# Patient Record
Sex: FEMALE | Race: WHITE | NOT HISPANIC OR LATINO | Employment: FULL TIME | URBAN - METROPOLITAN AREA
[De-identification: names, ages, dates, MRNs, and addresses within clinical notes are randomized per-mention and may not be internally consistent; named-entity substitution may affect disease eponyms.]

---

## 2023-08-04 ENCOUNTER — OFFICE VISIT (OUTPATIENT)
Dept: PRIMARY CARE | Facility: CLINIC | Age: 68
End: 2023-08-04
Payer: MEDICARE

## 2023-08-04 VITALS
BODY MASS INDEX: 39.6 KG/M2 | OXYGEN SATURATION: 95 % | HEART RATE: 69 BPM | SYSTOLIC BLOOD PRESSURE: 120 MMHG | DIASTOLIC BLOOD PRESSURE: 60 MMHG | WEIGHT: 276 LBS

## 2023-08-04 DIAGNOSIS — Z79.4 TYPE 2 DIABETES MELLITUS WITHOUT COMPLICATION, WITH LONG-TERM CURRENT USE OF INSULIN (MULTI): ICD-10-CM

## 2023-08-04 DIAGNOSIS — E78.01 FAMILIAL HYPERCHOLESTEREMIA: ICD-10-CM

## 2023-08-04 DIAGNOSIS — I10 BENIGN ESSENTIAL HYPERTENSION: Primary | ICD-10-CM

## 2023-08-04 DIAGNOSIS — Z13.820 ENCOUNTER FOR SCREENING FOR OSTEOPOROSIS: ICD-10-CM

## 2023-08-04 DIAGNOSIS — Z12.31 ENCOUNTER FOR SCREENING MAMMOGRAM FOR MALIGNANT NEOPLASM OF BREAST: ICD-10-CM

## 2023-08-04 DIAGNOSIS — E11.9 TYPE 2 DIABETES MELLITUS WITHOUT COMPLICATION, WITH LONG-TERM CURRENT USE OF INSULIN (MULTI): ICD-10-CM

## 2023-08-04 DIAGNOSIS — Z12.12 SCREENING FOR COLORECTAL CANCER: ICD-10-CM

## 2023-08-04 DIAGNOSIS — Z12.11 SCREENING FOR COLORECTAL CANCER: ICD-10-CM

## 2023-08-04 DIAGNOSIS — I42.9 CARDIOMYOPATHY, UNSPECIFIED TYPE (MULTI): ICD-10-CM

## 2023-08-04 PROBLEM — I49.3 PVC (PREMATURE VENTRICULAR CONTRACTION): Status: ACTIVE | Noted: 2023-08-04

## 2023-08-04 PROBLEM — Z86.79 H/O: HYPERTENSION: Status: ACTIVE | Noted: 2021-09-03

## 2023-08-04 PROBLEM — M76.31 ILIOTIBIAL BAND SYNDROME OF RIGHT SIDE: Status: ACTIVE | Noted: 2023-08-04

## 2023-08-04 PROBLEM — E66.812 OBESITY, CLASS II, BMI 35-39.9: Status: ACTIVE | Noted: 2021-10-28

## 2023-08-04 PROBLEM — E66.9 OBESITY (BMI 30-39.9): Status: ACTIVE | Noted: 2021-09-03

## 2023-08-04 PROBLEM — R94.31 ABNORMAL EKG: Status: ACTIVE | Noted: 2023-08-04

## 2023-08-04 PROBLEM — Z96.641 HISTORY OF TOTAL RIGHT HIP REPLACEMENT: Status: ACTIVE | Noted: 2022-01-28

## 2023-08-04 PROBLEM — Z86.718 HISTORY OF DEEP VENOUS THROMBOSIS: Status: ACTIVE | Noted: 2021-09-10

## 2023-08-04 PROBLEM — R94.39 ABNORMAL STRESS TEST: Status: ACTIVE | Noted: 2023-08-04

## 2023-08-04 PROBLEM — N39.0 ACUTE UTI: Status: ACTIVE | Noted: 2023-08-04

## 2023-08-04 PROBLEM — L65.9 ALOPECIA: Status: ACTIVE | Noted: 2023-08-04

## 2023-08-04 PROBLEM — E66.9 OBESITY: Status: ACTIVE | Noted: 2021-09-03

## 2023-08-04 PROBLEM — M25.551 HIP PAIN, RIGHT: Status: ACTIVE | Noted: 2023-08-04

## 2023-08-04 PROBLEM — E78.49 OTHER HYPERLIPIDEMIA: Status: ACTIVE | Noted: 2021-10-05

## 2023-08-04 PROBLEM — G47.9 SLEEP DIFFICULTIES: Status: ACTIVE | Noted: 2023-08-04

## 2023-08-04 PROBLEM — E66.9 OBESITY, CLASS II, BMI 35-39.9: Status: ACTIVE | Noted: 2021-10-28

## 2023-08-04 PROBLEM — M25.561 KNEE PAIN, RIGHT: Status: ACTIVE | Noted: 2023-08-04

## 2023-08-04 PROBLEM — R53.83 FATIGUE: Status: ACTIVE | Noted: 2023-08-04

## 2023-08-04 PROBLEM — Z86.39 HISTORY OF DIABETES MELLITUS: Status: ACTIVE | Noted: 2021-09-03

## 2023-08-04 PROBLEM — R00.1 BRADYCARDIA: Status: ACTIVE | Noted: 2023-08-04

## 2023-08-04 PROBLEM — M99.05 SEGMENTAL AND SOMATIC DYSFUNCTION OF PELVIC REGION: Status: ACTIVE | Noted: 2023-08-04

## 2023-08-04 PROBLEM — I49.9 IRREGULAR HEARTBEAT: Status: ACTIVE | Noted: 2023-08-04

## 2023-08-04 PROBLEM — R06.02 EXERTIONAL SHORTNESS OF BREATH: Status: ACTIVE | Noted: 2023-08-04

## 2023-08-04 PROCEDURE — 3074F SYST BP LT 130 MM HG: CPT | Performed by: INTERNAL MEDICINE

## 2023-08-04 PROCEDURE — 1159F MED LIST DOCD IN RCRD: CPT | Performed by: INTERNAL MEDICINE

## 2023-08-04 PROCEDURE — 4010F ACE/ARB THERAPY RXD/TAKEN: CPT | Performed by: INTERNAL MEDICINE

## 2023-08-04 PROCEDURE — 3078F DIAST BP <80 MM HG: CPT | Performed by: INTERNAL MEDICINE

## 2023-08-04 PROCEDURE — 1036F TOBACCO NON-USER: CPT | Performed by: INTERNAL MEDICINE

## 2023-08-04 PROCEDURE — 1160F RVW MEDS BY RX/DR IN RCRD: CPT | Performed by: INTERNAL MEDICINE

## 2023-08-04 PROCEDURE — 99214 OFFICE O/P EST MOD 30 MIN: CPT | Performed by: INTERNAL MEDICINE

## 2023-08-04 RX ORDER — CALCIUM CITRATE/VITAMIN D3 200MG-6.25
TABLET ORAL
COMMUNITY

## 2023-08-04 RX ORDER — MUPIROCIN CALCIUM 20 MG/G
CREAM TOPICAL 3 TIMES DAILY
Qty: 15 G | Refills: 1 | Status: SHIPPED | OUTPATIENT
Start: 2023-08-04 | End: 2023-08-04 | Stop reason: ALTCHOICE

## 2023-08-04 RX ORDER — LANCETS 30 GAUGE
EACH MISCELLANEOUS
COMMUNITY
Start: 2023-05-15 | End: 2023-10-11 | Stop reason: SDUPTHER

## 2023-08-04 RX ORDER — ORAL SEMAGLUTIDE 7 MG/1
TABLET ORAL
COMMUNITY
Start: 2021-07-07 | End: 2024-04-09 | Stop reason: SDUPTHER

## 2023-08-04 RX ORDER — ACETAMINOPHEN AND DIPHENHYDRAMINE HYDROCHLORIDE 500; 25 MG/1; MG/1
1 TABLET, FILM COATED ORAL
COMMUNITY
Start: 2021-12-01

## 2023-08-04 RX ORDER — CHOLECALCIFEROL (VITAMIN D3) 125 MCG
CAPSULE ORAL
COMMUNITY

## 2023-08-04 RX ORDER — METFORMIN HYDROCHLORIDE 1000 MG/1
TABLET ORAL
COMMUNITY
End: 2024-05-06

## 2023-08-04 RX ORDER — GLIPIZIDE 10 MG/1
10 TABLET ORAL 2 TIMES DAILY
COMMUNITY
End: 2024-06-09

## 2023-08-04 RX ORDER — CALCIUM CITRATE/VITAMIN D3 200MG-6.25
TABLET ORAL
COMMUNITY
Start: 2015-06-12

## 2023-08-04 RX ORDER — INSULIN DEGLUDEC 200 U/ML
INJECTION, SOLUTION SUBCUTANEOUS
COMMUNITY
End: 2024-04-09 | Stop reason: SDUPTHER

## 2023-08-04 NOTE — PATIENT INSTRUCTIONS
-130  DBP 60-80    Please complete fasting blood work. Fast for 10 hours, black coffee and water the morning of labs are OK.     Mamm/dexa [phone]     [facility]: [phone]    Come back in 6 months

## 2023-08-04 NOTE — PROGRESS NOTES
Subjective   Patient ID: Zonia Schaffer is a 67 y.o. female who presents for Follow-up.    HPI     Overall doing well  BP has been great at home   No CP, SOB, JOHNSON or LE edema     Review of Systems   All other systems reviewed and are negative.      Objective   /81   Pulse 69   Wt 125 kg (276 lb)   SpO2 95%   BMI 39.60 kg/m²     Physical Exam  Constitutional:       Appearance: Normal appearance.   HENT:      Head: Normocephalic and atraumatic.   Cardiovascular:      Rate and Rhythm: Normal rate and regular rhythm.      Heart sounds: Normal heart sounds. No murmur heard.     No gallop.   Pulmonary:      Effort: Pulmonary effort is normal. No respiratory distress.      Breath sounds: No wheezing or rales.   Skin:     General: Skin is warm and dry.      Findings: No rash.   Neurological:      Mental Status: She is alert and oriented to person, place, and time. Mental status is at baseline.   Psychiatric:         Mood and Affect: Mood normal.         Behavior: Behavior normal.         Assessment/Plan       #HTN  -Good at home. Cont carvedilol to 12.5mg BID, lisinopril 40mg daily, HCTZ 25mg daily.      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 Following with cardiology.  Cont carvedilol      #HLD  -Cont atorva 40mg daily , C 10/21 with trivial nonob CAD      #DM2  -Follows with Dr. Juan. Cont Rybelsus, , Tresiba 94 U daily, metformin 1000mg bid and glipizide 10mg bid         Mamm, DEXA  and [place] ordered     Will get Shingrix and P20 at pharmacy      RTC 6 mo

## 2023-09-05 RX ORDER — IBUPROFEN 200 MG
1 TABLET ORAL EVERY 8 HOURS PRN
COMMUNITY
Start: 2021-10-29 | End: 2024-02-09 | Stop reason: ALTCHOICE

## 2023-09-05 RX ORDER — QUINAPRIL 40 MG/1
1 TABLET ORAL EVERY 12 HOURS
COMMUNITY
Start: 2022-12-22 | End: 2024-02-09 | Stop reason: ALTCHOICE

## 2023-09-05 RX ORDER — LANCETS
EACH MISCELLANEOUS
COMMUNITY

## 2023-09-06 PROBLEM — Z79.4 LONG TERM (CURRENT) USE OF INSULIN (MULTI): Status: ACTIVE | Noted: 2023-09-06

## 2023-09-19 DIAGNOSIS — I10 ESSENTIAL (PRIMARY) HYPERTENSION: ICD-10-CM

## 2023-09-20 RX ORDER — LISINOPRIL 40 MG/1
TABLET ORAL
Qty: 90 TABLET | Refills: 1 | Status: SHIPPED | OUTPATIENT
Start: 2023-09-20 | End: 2024-03-18

## 2023-10-09 ENCOUNTER — LAB (OUTPATIENT)
Dept: LAB | Facility: LAB | Age: 68
End: 2023-10-09
Payer: MEDICARE

## 2023-10-09 DIAGNOSIS — Z79.4 TYPE 2 DIABETES MELLITUS WITHOUT COMPLICATION, WITH LONG-TERM CURRENT USE OF INSULIN (MULTI): ICD-10-CM

## 2023-10-09 DIAGNOSIS — E78.01 FAMILIAL HYPERCHOLESTEREMIA: ICD-10-CM

## 2023-10-09 DIAGNOSIS — E11.9 TYPE 2 DIABETES MELLITUS WITHOUT COMPLICATION, WITH LONG-TERM CURRENT USE OF INSULIN (MULTI): ICD-10-CM

## 2023-10-09 LAB
ALBUMIN SERPL BCP-MCNC: 4 G/DL (ref 3.4–5)
ALP SERPL-CCNC: 58 U/L (ref 33–136)
ALT SERPL W P-5'-P-CCNC: 13 U/L (ref 7–45)
ANION GAP SERPL CALC-SCNC: 11 MMOL/L (ref 10–20)
AST SERPL W P-5'-P-CCNC: 13 U/L (ref 9–39)
BILIRUB SERPL-MCNC: 0.5 MG/DL (ref 0–1.2)
BUN SERPL-MCNC: 19 MG/DL (ref 6–23)
CALCIUM SERPL-MCNC: 9 MG/DL (ref 8.6–10.3)
CHLORIDE SERPL-SCNC: 104 MMOL/L (ref 98–107)
CHOLEST SERPL-MCNC: 154 MG/DL (ref 0–199)
CHOLESTEROL/HDL RATIO: 4.4
CO2 SERPL-SCNC: 31 MMOL/L (ref 21–32)
CREAT SERPL-MCNC: 0.67 MG/DL (ref 0.5–1.05)
ERYTHROCYTE [DISTWIDTH] IN BLOOD BY AUTOMATED COUNT: 13.8 % (ref 11.5–14.5)
EST. AVERAGE GLUCOSE BLD GHB EST-MCNC: 137 MG/DL
GFR SERPL CREATININE-BSD FRML MDRD: >90 ML/MIN/1.73M*2
GLUCOSE SERPL-MCNC: 135 MG/DL (ref 74–99)
HBA1C MFR BLD: 6.4 %
HCT VFR BLD AUTO: 43.5 % (ref 36–46)
HDLC SERPL-MCNC: 35.3 MG/DL
HGB BLD-MCNC: 13.6 G/DL (ref 12–16)
LDLC SERPL CALC-MCNC: 75 MG/DL (ref 140–190)
MCH RBC QN AUTO: 28.3 PG (ref 26–34)
MCHC RBC AUTO-ENTMCNC: 31.3 G/DL (ref 32–36)
MCV RBC AUTO: 90 FL (ref 80–100)
NON HDL CHOLESTEROL: 119 MG/DL (ref 0–149)
NRBC BLD-RTO: 0 /100 WBCS (ref 0–0)
PLATELET # BLD AUTO: 213 X10*3/UL (ref 150–450)
PMV BLD AUTO: 11.9 FL (ref 7.5–11.5)
POTASSIUM SERPL-SCNC: 4.4 MMOL/L (ref 3.5–5.3)
PROT SERPL-MCNC: 6.3 G/DL (ref 6.4–8.2)
RBC # BLD AUTO: 4.81 X10*6/UL (ref 4–5.2)
SODIUM SERPL-SCNC: 142 MMOL/L (ref 136–145)
TRIGL SERPL-MCNC: 217 MG/DL (ref 0–149)
VLDL: 43 MG/DL (ref 0–40)
WBC # BLD AUTO: 6.2 X10*3/UL (ref 4.4–11.3)

## 2023-10-09 PROCEDURE — 36415 COLL VENOUS BLD VENIPUNCTURE: CPT

## 2023-10-09 PROCEDURE — 85027 COMPLETE CBC AUTOMATED: CPT

## 2023-10-09 PROCEDURE — 83036 HEMOGLOBIN GLYCOSYLATED A1C: CPT

## 2023-10-09 PROCEDURE — 80061 LIPID PANEL: CPT

## 2023-10-09 PROCEDURE — 80053 COMPREHEN METABOLIC PANEL: CPT

## 2023-10-11 ENCOUNTER — OFFICE VISIT (OUTPATIENT)
Dept: ENDOCRINOLOGY | Facility: CLINIC | Age: 68
End: 2023-10-11
Payer: MEDICARE

## 2023-10-11 VITALS
WEIGHT: 273 LBS | BODY MASS INDEX: 39.17 KG/M2 | DIASTOLIC BLOOD PRESSURE: 81 MMHG | HEART RATE: 74 BPM | SYSTOLIC BLOOD PRESSURE: 179 MMHG

## 2023-10-11 DIAGNOSIS — E11.9 TYPE 2 DIABETES MELLITUS WITHOUT COMPLICATION, WITH LONG-TERM CURRENT USE OF INSULIN (MULTI): Primary | ICD-10-CM

## 2023-10-11 DIAGNOSIS — Z79.4 TYPE 2 DIABETES MELLITUS WITHOUT COMPLICATION, WITH LONG-TERM CURRENT USE OF INSULIN (MULTI): Primary | ICD-10-CM

## 2023-10-11 PROCEDURE — 1160F RVW MEDS BY RX/DR IN RCRD: CPT | Performed by: INTERNAL MEDICINE

## 2023-10-11 PROCEDURE — 1159F MED LIST DOCD IN RCRD: CPT | Performed by: INTERNAL MEDICINE

## 2023-10-11 PROCEDURE — 99214 OFFICE O/P EST MOD 30 MIN: CPT | Performed by: INTERNAL MEDICINE

## 2023-10-11 PROCEDURE — 1036F TOBACCO NON-USER: CPT | Performed by: INTERNAL MEDICINE

## 2023-10-11 PROCEDURE — 4010F ACE/ARB THERAPY RXD/TAKEN: CPT | Performed by: INTERNAL MEDICINE

## 2023-10-11 PROCEDURE — 3044F HG A1C LEVEL LT 7.0%: CPT | Performed by: INTERNAL MEDICINE

## 2023-10-11 PROCEDURE — 3079F DIAST BP 80-89 MM HG: CPT | Performed by: INTERNAL MEDICINE

## 2023-10-11 PROCEDURE — 3048F LDL-C <100 MG/DL: CPT | Performed by: INTERNAL MEDICINE

## 2023-10-11 PROCEDURE — 3077F SYST BP >= 140 MM HG: CPT | Performed by: INTERNAL MEDICINE

## 2023-10-11 RX ORDER — LANCETS 30 GAUGE
EACH MISCELLANEOUS
Qty: 100 EACH | Refills: 3 | Status: SHIPPED | OUTPATIENT
Start: 2023-10-11

## 2023-10-11 ASSESSMENT — ENCOUNTER SYMPTOMS
SORE THROAT: 0
ABDOMINAL PAIN: 0
DIFFICULTY URINATING: 0
UNEXPECTED WEIGHT CHANGE: 0
SHORTNESS OF BREATH: 0
CONSTIPATION: 0
HEADACHES: 0
ENDOCRINE COMMENTS: AS ABOVE
FREQUENCY: 0
NAUSEA: 1
DIARRHEA: 0
VOMITING: 0

## 2023-10-11 NOTE — LETTER
October 11, 2023     Bijan Diaz DO  [id number] [facility]  [address]  [facility] [address]    Patient: Zonia Schaffer   YOB: 1955   Date of Visit: 10/11/2023       Dear Dr. Bijan Diaz DO:    Thank you for referring Zonia Schaffer to me for evaluation. Below are my notes for this consultation.  If you have questions, please do not hesitate to call me. I look forward to following your patient along with you.       Sincerely,     John Juan MD      CC: No Recipients  ______________________________________________________________________________________    History Of Present Illness  Zonia Schaffer is a 67 y.o. female     Duration of type 2 diabetes mellitus:  17 years  Complications:  none    Rybelsus 7 mg/day, with nausea, no vomiting  Metformin 1000 mg BID  Glipizide 10 mg BID  Tresiba 94 units at bedtime    Intolerant to Farxiga due to vaginal itching    Patient is testing glucose 1 time daily  Records reviewed, on file    Last eye exam:  November 2022    Past Medical History  She has a past medical history of Personal history of other diseases of the circulatory system and Personal history of other endocrine, nutritional and metabolic disease.    Surgical History  She has a past surgical history that includes Hernia repair (02/18/2014).     Social History  She reports that she has never smoked. She has never used smokeless tobacco. No history on file for alcohol use and drug use.    Family History  Family History   Problem Relation Name Age of Onset   • Hypertension Mother     • Ovarian cancer Mother     • Other (cardiac disorder) Father     • Diabetes Other Grandmother        Allergies  Sulfa (sulfonamide antibiotics) and Cortisone    Review of Systems   Constitutional:  Negative for unexpected weight change.   HENT:  Negative for sore throat.    Eyes:  Negative for visual disturbance.   Respiratory:  Negative for shortness of breath.    Cardiovascular:  Negative for chest pain.   Gastrointestinal:   "Positive for nausea. Negative for abdominal pain, constipation, diarrhea and vomiting.   Endocrine:        As above   Genitourinary:  Negative for difficulty urinating and frequency.   Neurological:  Negative for headaches.         Last Recorded Vitals  Blood pressure 179/81, pulse 74, weight 124 kg (273 lb).    Physical Exam     Relevant Results  Glucose (mg/dL)   Date Value   10/09/2023 135 (H)   02/01/2023 107 (H)   04/29/2022 301 (H)   01/13/2022 185 (H)     Hemoglobin A1C (%)   Date Value   10/09/2023 6.4 (H)   10/06/2021 7.3 (H)     Bicarbonate (mmol/L)   Date Value   10/09/2023 31   02/01/2023 33 (H)   04/29/2022 29   01/13/2022 31     Urea Nitrogen (mg/dL)   Date Value   10/09/2023 19   02/01/2023 22   04/29/2022 25 (H)   01/13/2022 25 (H)     Creatinine (mg/dL)   Date Value   10/09/2023 0.67   02/01/2023 0.72   04/29/2022 0.85   01/13/2022 0.71     No components found for: \"[id number]\"  Lab Results   Component Value Date    CHOL 154 10/09/2023    CHOL 156 02/01/2023    CHOL 132 01/13/2022     Lab Results   Component Value Date    HDL 35.3 10/09/2023    HDL 33.3 (A) 02/01/2023    HDL 34.2 (A) 01/13/2022     Lab Results   Component Value Date    LDLCALC 75 (L) 10/09/2023     Lab Results   Component Value Date    TRIG 217 (H) 10/09/2023    TRIG 216 (H) 02/01/2023    TRIG 179 (H) 01/13/2022     IMPRESSION  TYPE 2 DIABETES MELLITUS  LONG TERM CURRENT INSULIN USE  Excellent glucose control      RECOMMENDATIONS  Continue current program    Follow up 6 months    Labs as ordered by Dr. Diaz.  Please bring glucose records to all appointments.           "

## 2023-10-11 NOTE — PROGRESS NOTES
History Of Present Illness  Zonia Schaffer is a 67 y.o. female     Duration of type 2 diabetes mellitus:  17 years  Complications:  none    Rybelsus 7 mg/day, with nausea, no vomiting  Metformin 1000 mg BID  Glipizide 10 mg BID  Tresiba 94 units at bedtime    Intolerant to Farxiga due to vaginal itching    Patient is testing glucose 1 time daily  Records reviewed, on file    Last eye exam:  November 2022    Past Medical History  She has a past medical history of Personal history of other diseases of the circulatory system and Personal history of other endocrine, nutritional and metabolic disease.    Surgical History  She has a past surgical history that includes Hernia repair (02/18/2014).     Social History  She reports that she has never smoked. She has never used smokeless tobacco. No history on file for alcohol use and drug use.    Family History  Family History   Problem Relation Name Age of Onset    Hypertension Mother      Ovarian cancer Mother      Other (cardiac disorder) Father      Diabetes Other Grandmother        Allergies  Sulfa (sulfonamide antibiotics) and Cortisone    Review of Systems   Constitutional:  Negative for unexpected weight change.   HENT:  Negative for sore throat.    Eyes:  Negative for visual disturbance.   Respiratory:  Negative for shortness of breath.    Cardiovascular:  Negative for chest pain.   Gastrointestinal:  Positive for nausea. Negative for abdominal pain, constipation, diarrhea and vomiting.   Endocrine:        As above   Genitourinary:  Negative for difficulty urinating and frequency.   Neurological:  Negative for headaches.         Last Recorded Vitals  Blood pressure 179/81, pulse 74, weight 124 kg (273 lb).    Physical Exam     Relevant Results  Glucose (mg/dL)   Date Value   10/09/2023 135 (H)   02/01/2023 107 (H)   04/29/2022 301 (H)   01/13/2022 185 (H)     Hemoglobin A1C (%)   Date Value   10/09/2023 6.4 (H)   10/06/2021 7.3 (H)     Bicarbonate (mmol/L)   Date  "Value   10/09/2023 31   02/01/2023 33 (H)   04/29/2022 29   01/13/2022 31     Urea Nitrogen (mg/dL)   Date Value   10/09/2023 19   02/01/2023 22   04/29/2022 25 (H)   01/13/2022 25 (H)     Creatinine (mg/dL)   Date Value   10/09/2023 0.67   02/01/2023 0.72   04/29/2022 0.85   01/13/2022 0.71     No components found for: \"[id number]\"  Lab Results   Component Value Date    CHOL 154 10/09/2023    CHOL 156 02/01/2023    CHOL 132 01/13/2022     Lab Results   Component Value Date    HDL 35.3 10/09/2023    HDL 33.3 (A) 02/01/2023    HDL 34.2 (A) 01/13/2022     Lab Results   Component Value Date    LDLCALC 75 (L) 10/09/2023     Lab Results   Component Value Date    TRIG 217 (H) 10/09/2023    TRIG 216 (H) 02/01/2023    TRIG 179 (H) 01/13/2022     IMPRESSION  TYPE 2 DIABETES MELLITUS  LONG TERM CURRENT INSULIN USE  Excellent glucose control      RECOMMENDATIONS  Continue current program    Follow up 6 months    Labs as ordered by Dr. Diaz.  Please bring glucose records to all appointments.      "

## 2023-11-16 DIAGNOSIS — I10 ESSENTIAL (PRIMARY) HYPERTENSION: ICD-10-CM

## 2023-11-16 DIAGNOSIS — E78.01 FAMILIAL HYPERCHOLESTEROLEMIA: ICD-10-CM

## 2023-11-16 NOTE — TELEPHONE ENCOUNTER
Requested Prescriptions     Pending Prescriptions Disp Refills    carvedilol (Coreg) 12.5 mg tablet [Pharmacy Med Name: carvedilol 12.5 mg tablet] 180 tablet 1     Sig: TAKE 1 TABLET BY MOUTH TWICE DAILY    atorvastatin (Lipitor) 40 mg tablet [Pharmacy Med Name: atorvastatin 40 mg tablet] 90 tablet 1     Sig: TAKE 1 TABLET BY MOUTH EVERY DAY    hydroCHLOROthiazide (HYDRODiuril) 25 mg tablet [Pharmacy Med Name: hydrochlorothiazide 25 mg tablet] 90 tablet 1     Sig: TAKE 1 TABLET BY MOUTH EVERY DAY

## 2023-11-17 RX ORDER — CARVEDILOL 12.5 MG/1
TABLET ORAL
Qty: 180 TABLET | Refills: 1 | Status: SHIPPED | OUTPATIENT
Start: 2023-11-17 | End: 2024-05-06 | Stop reason: SDUPTHER

## 2023-11-17 RX ORDER — ATORVASTATIN CALCIUM 40 MG/1
TABLET, FILM COATED ORAL
Qty: 90 TABLET | Refills: 1 | Status: SHIPPED | OUTPATIENT
Start: 2023-11-17 | End: 2024-05-13 | Stop reason: SDUPTHER

## 2023-11-17 RX ORDER — HYDROCHLOROTHIAZIDE 25 MG/1
TABLET ORAL
Qty: 90 TABLET | Refills: 1 | Status: SHIPPED | OUTPATIENT
Start: 2023-11-17 | End: 2024-05-13 | Stop reason: SDUPTHER

## 2024-02-07 NOTE — PROGRESS NOTES
Subjective   Patient ID: Zonia Schaffer is a 68 y.o. female who presents for Medicare Annual Wellness Visit Subsequent.    HPI     Overall doing well  No specific concerns   No CP, SOB, JOHNSON or LE edema       Review of Systems   All other systems reviewed and are negative.      Objective   There were no vitals taken for this visit.    Physical Exam  Constitutional:       Appearance: Normal appearance.   HENT:      Head: Normocephalic and atraumatic.   Cardiovascular:      Rate and Rhythm: Normal rate and regular rhythm.      Heart sounds: Normal heart sounds. No murmur heard.     No gallop.   Pulmonary:      Effort: Pulmonary effort is normal. No respiratory distress.      Breath sounds: No wheezing or rales.   Musculoskeletal:      Comments: Trace b/l LE edma, purplish discoloration of b/l LE    Skin:     General: Skin is warm and dry.      Findings: No rash.   Neurological:      Mental Status: She is alert and oriented to person, place, and time. Mental status is at baseline.   Psychiatric:         Mood and Affect: Mood normal.         Behavior: Behavior normal.         Assessment/Plan       #HTN  -Good at home. Cont carvedilol to 12.5mg BID, lisinopril 40mg daily, HCTZ 25mg daily.      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 Following with cardiology.  Cont carvedilol      #HLD  -Cont atorva 40mg daily , [facility] 10/21 with trivial nonob CAD      #DM2  -Follows with Dr. Juan. Cont Rybelsus, , Tresiba 94 U daily, metformin 1000mg bid and glipizide 10mg bid     #b/l LE venous insufficiency   -Encouraged compression stockings and leg elevation         Mamm, DEXA  and [place] ordered--reminded to complete      Will get Shingrix at pharmacy       RTC 6 mo

## 2024-02-09 ENCOUNTER — OFFICE VISIT (OUTPATIENT)
Dept: PRIMARY CARE | Facility: CLINIC | Age: 69
End: 2024-02-09
Payer: MEDICARE

## 2024-02-09 VITALS
SYSTOLIC BLOOD PRESSURE: 130 MMHG | OXYGEN SATURATION: 96 % | WEIGHT: 270 LBS | HEART RATE: 61 BPM | BODY MASS INDEX: 38.65 KG/M2 | DIASTOLIC BLOOD PRESSURE: 70 MMHG | HEIGHT: 70 IN

## 2024-02-09 DIAGNOSIS — I10 BENIGN ESSENTIAL HYPERTENSION: ICD-10-CM

## 2024-02-09 DIAGNOSIS — I42.9 CARDIOMYOPATHY, UNSPECIFIED TYPE (MULTI): ICD-10-CM

## 2024-02-09 DIAGNOSIS — Z79.4 TYPE 2 DIABETES MELLITUS WITH HYPERGLYCEMIA, WITH LONG-TERM CURRENT USE OF INSULIN (MULTI): ICD-10-CM

## 2024-02-09 DIAGNOSIS — E11.65 TYPE 2 DIABETES MELLITUS WITH HYPERGLYCEMIA, WITH LONG-TERM CURRENT USE OF INSULIN (MULTI): ICD-10-CM

## 2024-02-09 DIAGNOSIS — E11.9 TYPE 2 DIABETES MELLITUS WITHOUT COMPLICATION, WITH LONG-TERM CURRENT USE OF INSULIN (MULTI): ICD-10-CM

## 2024-02-09 DIAGNOSIS — E78.01 FAMILIAL HYPERCHOLESTEREMIA: ICD-10-CM

## 2024-02-09 DIAGNOSIS — Z00.00 ROUTINE GENERAL MEDICAL EXAMINATION AT HEALTH CARE FACILITY: Primary | ICD-10-CM

## 2024-02-09 DIAGNOSIS — Z79.4 TYPE 2 DIABETES MELLITUS WITHOUT COMPLICATION, WITH LONG-TERM CURRENT USE OF INSULIN (MULTI): ICD-10-CM

## 2024-02-09 DIAGNOSIS — I87.2 VENOUS INSUFFICIENCY OF BOTH LOWER EXTREMITIES: ICD-10-CM

## 2024-02-09 PROCEDURE — 1159F MED LIST DOCD IN RCRD: CPT | Performed by: INTERNAL MEDICINE

## 2024-02-09 PROCEDURE — 4010F ACE/ARB THERAPY RXD/TAKEN: CPT | Performed by: INTERNAL MEDICINE

## 2024-02-09 PROCEDURE — G0439 PPPS, SUBSEQ VISIT: HCPCS | Performed by: INTERNAL MEDICINE

## 2024-02-09 PROCEDURE — 1170F FXNL STATUS ASSESSED: CPT | Performed by: INTERNAL MEDICINE

## 2024-02-09 PROCEDURE — 3078F DIAST BP <80 MM HG: CPT | Performed by: INTERNAL MEDICINE

## 2024-02-09 PROCEDURE — 1036F TOBACCO NON-USER: CPT | Performed by: INTERNAL MEDICINE

## 2024-02-09 PROCEDURE — 3075F SYST BP GE 130 - 139MM HG: CPT | Performed by: INTERNAL MEDICINE

## 2024-02-09 PROCEDURE — 1160F RVW MEDS BY RX/DR IN RCRD: CPT | Performed by: INTERNAL MEDICINE

## 2024-02-09 PROCEDURE — 99214 OFFICE O/P EST MOD 30 MIN: CPT | Performed by: INTERNAL MEDICINE

## 2024-02-09 ASSESSMENT — ACTIVITIES OF DAILY LIVING (ADL)
DOING_HOUSEWORK: INDEPENDENT
MANAGING_FINANCES: INDEPENDENT
GROCERY_SHOPPING: INDEPENDENT
BATHING: INDEPENDENT
DRESSING: INDEPENDENT
TAKING_MEDICATION: INDEPENDENT

## 2024-02-09 ASSESSMENT — PATIENT HEALTH QUESTIONNAIRE - PHQ9
SUM OF ALL RESPONSES TO PHQ9 QUESTIONS 1 AND 2: 0
2. FEELING DOWN, DEPRESSED OR HOPELESS: NOT AT ALL
1. LITTLE INTEREST OR PLEASURE IN DOING THINGS: NOT AT ALL

## 2024-02-09 NOTE — PATIENT INSTRUCTIONS
Please complete fasting blood work. Fast for 10 hours, black coffee and water the morning of labs are OK.     DEXA/Mamm  [phone]     [facility]: [phone]   [facility]: [phone]     Compressions and leg elevation for venous insuffiency    6 mo , get fast labs in March

## 2024-03-17 DIAGNOSIS — I10 ESSENTIAL (PRIMARY) HYPERTENSION: ICD-10-CM

## 2024-03-18 RX ORDER — LISINOPRIL 40 MG/1
TABLET ORAL
Qty: 90 TABLET | Refills: 2 | Status: SHIPPED | OUTPATIENT
Start: 2024-03-18

## 2024-03-18 NOTE — TELEPHONE ENCOUNTER
Requested Prescriptions     Pending Prescriptions Disp Refills    lisinopril 40 mg tablet 90 tablet 2     Sig: TAKE 1 TABLET BY MOUTH ONCE DAILY

## 2024-04-09 ENCOUNTER — OFFICE VISIT (OUTPATIENT)
Dept: ENDOCRINOLOGY | Facility: CLINIC | Age: 69
End: 2024-04-09
Payer: MEDICARE

## 2024-04-09 VITALS
DIASTOLIC BLOOD PRESSURE: 78 MMHG | WEIGHT: 268.8 LBS | RESPIRATION RATE: 18 BRPM | HEART RATE: 69 BPM | BODY MASS INDEX: 38.57 KG/M2 | SYSTOLIC BLOOD PRESSURE: 136 MMHG

## 2024-04-09 DIAGNOSIS — E11.9 TYPE 2 DIABETES MELLITUS WITHOUT COMPLICATION, WITH LONG-TERM CURRENT USE OF INSULIN (MULTI): Primary | ICD-10-CM

## 2024-04-09 DIAGNOSIS — Z79.4 TYPE 2 DIABETES MELLITUS WITHOUT COMPLICATION, WITH LONG-TERM CURRENT USE OF INSULIN (MULTI): Primary | ICD-10-CM

## 2024-04-09 LAB — POC HEMOGLOBIN A1C: 6.6 % (ref 4.2–6.5)

## 2024-04-09 PROCEDURE — 83036 HEMOGLOBIN GLYCOSYLATED A1C: CPT | Performed by: INTERNAL MEDICINE

## 2024-04-09 PROCEDURE — 3075F SYST BP GE 130 - 139MM HG: CPT | Performed by: INTERNAL MEDICINE

## 2024-04-09 PROCEDURE — 99214 OFFICE O/P EST MOD 30 MIN: CPT | Performed by: INTERNAL MEDICINE

## 2024-04-09 PROCEDURE — 4010F ACE/ARB THERAPY RXD/TAKEN: CPT | Performed by: INTERNAL MEDICINE

## 2024-04-09 PROCEDURE — 3078F DIAST BP <80 MM HG: CPT | Performed by: INTERNAL MEDICINE

## 2024-04-09 PROCEDURE — 3048F LDL-C <100 MG/DL: CPT | Performed by: INTERNAL MEDICINE

## 2024-04-09 PROCEDURE — 1159F MED LIST DOCD IN RCRD: CPT | Performed by: INTERNAL MEDICINE

## 2024-04-09 PROCEDURE — 1160F RVW MEDS BY RX/DR IN RCRD: CPT | Performed by: INTERNAL MEDICINE

## 2024-04-09 PROCEDURE — 1126F AMNT PAIN NOTED NONE PRSNT: CPT | Performed by: INTERNAL MEDICINE

## 2024-04-09 RX ORDER — INSULIN DEGLUDEC 200 U/ML
INJECTION, SOLUTION SUBCUTANEOUS
Qty: 18 ML | Refills: 11 | Status: SHIPPED | OUTPATIENT
Start: 2024-04-09

## 2024-04-09 RX ORDER — ORAL SEMAGLUTIDE 7 MG/1
7 TABLET ORAL DAILY
Qty: 30 TABLET | Refills: 11 | Status: SHIPPED | OUTPATIENT
Start: 2024-04-09 | End: 2025-04-09

## 2024-04-09 ASSESSMENT — PAIN SCALES - GENERAL: PAINLEVEL: 0-NO PAIN

## 2024-04-09 NOTE — PATIENT INSTRUCTIONS
A1c 6.6%    RECOMMENDATIONS  Continue current program    Follow up 6 months  Labs as ordered by Dr. Diaz.

## 2024-04-09 NOTE — PROGRESS NOTES
"History Of Present Illness  Zonia Schaffer is a 68 y.o. female     Duration of type 2 diabetes mellitus:  17 years  Complications:  none       Rybelsus 7 mg/day, with nausea, no vomiting  Metformin 1000 mg BID  Glipizide 10 mg BID  Tresiba 94 units at bedtime    Intolerant to Farxiga due to vaginal itching     Past Medical History  She has a past medical history of Personal history of other diseases of the circulatory system and Personal history of other endocrine, nutritional and metabolic disease.    Surgical History  She has a past surgical history that includes Hernia repair (02/18/2014).     Social History  She reports that she has never smoked. She has never used smokeless tobacco. No history on file for alcohol use and drug use.    Family History  Family History   Problem Relation Name Age of Onset    Hypertension Mother      Ovarian cancer Mother      Other (cardiac disorder) Father      Diabetes Other Grandmother        Medications  Current Outpatient Medications   Medication Instructions    atorvastatin (Lipitor) 40 mg tablet TAKE 1 TABLET BY MOUTH EVERY DAY    blood sugar diagnostic (True Metrix Glucose Test Strip) strip     carvedilol (Coreg) 12.5 mg tablet TAKE 1 TABLET BY MOUTH TWICE DAILY    cholecalciferol (Vitamin D-3) 125 MCG (5000 UT) capsule oral    diphenhydrAMINE-acetaminophen (Tylenol PM Extra Strength)  mg per tablet 1 tablet, oral    glipiZIDE (GLUCOTROL) 10 mg, oral, 2 times daily    hydroCHLOROthiazide (HYDRODiuril) 25 mg tablet TAKE 1 TABLET BY MOUTH EVERY DAY    lancets misc For glucose testing once daily    lisinopril 40 mg tablet TAKE 1 TABLET BY MOUTH ONCE DAILY    metFORMIN (Glucophage) 1,000 mg tablet     Rybelsus 7 mg tablet     Tresiba FlexTouch U-200 200 unit/mL (3 mL) injection INJECT 94 UNITS SUBCUTANEOUSLY AT BEDTIME AS DIRECTED    True Metrix Glucose Test Strip strip Daily RT    Unifine Pentips 31 gauge x 1/4\" needle Once daily       Allergies  Dapagliflozin, Sulfa " (sulfonamide antibiotics), and Cortisone    Review of Systems   Constitutional:  Negative for appetite change and fever.   HENT:  Negative for sore throat.         Denies dry mouth   Eyes:  Negative for visual disturbance.   Respiratory:  Negative for cough and shortness of breath.    Cardiovascular:  Positive for palpitations. Negative for chest pain.   Gastrointestinal:  Positive for nausea. Negative for abdominal pain, constipation, diarrhea and vomiting.   Endocrine: Negative for polydipsia and polyuria.   Genitourinary:  Negative for frequency.   Musculoskeletal:  Negative for arthralgias.   Skin:  Negative for rash.   Neurological:  Negative for headaches.   Psychiatric/Behavioral:  The patient is not nervous/anxious.          Last Recorded Vitals  Blood pressure 136/78, pulse 69, resp. rate 18, weight 122 kg (268 lb 12.8 oz).    Physical Exam  Constitutional:       General: She is not in acute distress.  HENT:      Head: Normocephalic.      Mouth/Throat:      Mouth: Mucous membranes are moist.   Eyes:      Extraocular Movements: Extraocular movements intact.   Neck:      Thyroid: No thyroid mass or thyromegaly.   Cardiovascular:      Pulses:           Radial pulses are 2+ on the right side and 2+ on the left side.   Musculoskeletal:      Right lower leg: No edema.      Left lower leg: No edema.      Right foot: No deformity.      Left foot: No deformity.   Feet:      Comments: Callus formation at heels  Lymphadenopathy:      Cervical: No cervical adenopathy.   Neurological:      Mental Status: She is alert.      Motor: No tremor.   Psychiatric:         Mood and Affect: Affect normal.          Relevant Results  Glucose (mg/dL)   Date Value   10/09/2023 135 (H)   02/01/2023 107 (H)   04/29/2022 301 (H)   01/13/2022 185 (H)     POC HEMOGLOBIN A1c (%)   Date Value   04/09/2024 6.6 (A)     Hemoglobin A1C (%)   Date Value   10/09/2023 6.4 (H)   10/06/2021 7.3 (H)     Bicarbonate (mmol/L)   Date Value   10/09/2023  31   02/01/2023 33 (H)   04/29/2022 29   01/13/2022 31     Urea Nitrogen (mg/dL)   Date Value   10/09/2023 19   02/01/2023 22   04/29/2022 25 (H)   01/13/2022 25 (H)     Creatinine (mg/dL)   Date Value   10/09/2023 0.67   02/01/2023 0.72   04/29/2022 0.85   01/13/2022 0.71     Lab Results   Component Value Date    CHOL 154 10/09/2023    CHOL 156 02/01/2023    CHOL 132 01/13/2022     Lab Results   Component Value Date    HDL 35.3 10/09/2023    HDL 33.3 (A) 02/01/2023    HDL 34.2 (A) 01/13/2022     Lab Results   Component Value Date    LDLCALC 75 (L) 10/09/2023     Lab Results   Component Value Date    TRIG 217 (H) 10/09/2023    TRIG 216 (H) 02/01/2023    TRIG 179 (H) 01/13/2022       IMPRESSION  TYPE 2 DIABETES MELLITUS   LONG TERM CURRENT INSULIN USE  Rapid A1c 6.6%      RECOMMENDATIONS  Continue current program    Follow up 6 months  Labs as ordered by Dr. Diaz.

## 2024-04-10 ENCOUNTER — LAB (OUTPATIENT)
Dept: LAB | Facility: LAB | Age: 69
End: 2024-04-10
Payer: MEDICARE

## 2024-04-10 DIAGNOSIS — E78.01 FAMILIAL HYPERCHOLESTEREMIA: ICD-10-CM

## 2024-04-10 LAB
ALBUMIN SERPL BCP-MCNC: 4 G/DL (ref 3.4–5)
ALP SERPL-CCNC: 57 U/L (ref 33–136)
ALT SERPL W P-5'-P-CCNC: 12 U/L (ref 7–45)
ANION GAP SERPL CALC-SCNC: 13 MMOL/L (ref 10–20)
AST SERPL W P-5'-P-CCNC: 14 U/L (ref 9–39)
BILIRUB SERPL-MCNC: 0.5 MG/DL (ref 0–1.2)
BUN SERPL-MCNC: 25 MG/DL (ref 6–23)
CALCIUM SERPL-MCNC: 9.1 MG/DL (ref 8.6–10.3)
CHLORIDE SERPL-SCNC: 103 MMOL/L (ref 98–107)
CHOLEST SERPL-MCNC: 160 MG/DL (ref 0–199)
CHOLESTEROL/HDL RATIO: 5
CO2 SERPL-SCNC: 29 MMOL/L (ref 21–32)
CREAT SERPL-MCNC: 0.76 MG/DL (ref 0.5–1.05)
EGFRCR SERPLBLD CKD-EPI 2021: 87 ML/MIN/1.73M*2
ERYTHROCYTE [DISTWIDTH] IN BLOOD BY AUTOMATED COUNT: 13.6 % (ref 11.5–14.5)
GLUCOSE SERPL-MCNC: 98 MG/DL (ref 74–99)
HCT VFR BLD AUTO: 41.9 % (ref 36–46)
HDLC SERPL-MCNC: 32.2 MG/DL
HGB BLD-MCNC: 13.4 G/DL (ref 12–16)
LDLC SERPL CALC-MCNC: 85 MG/DL
MCH RBC QN AUTO: 28.5 PG (ref 26–34)
MCHC RBC AUTO-ENTMCNC: 32 G/DL (ref 32–36)
MCV RBC AUTO: 89 FL (ref 80–100)
NON HDL CHOLESTEROL: 128 MG/DL (ref 0–149)
NRBC BLD-RTO: 0 /100 WBCS (ref 0–0)
PLATELET # BLD AUTO: 208 X10*3/UL (ref 150–450)
POTASSIUM SERPL-SCNC: 4.2 MMOL/L (ref 3.5–5.3)
PROT SERPL-MCNC: 6.4 G/DL (ref 6.4–8.2)
RBC # BLD AUTO: 4.71 X10*6/UL (ref 4–5.2)
SODIUM SERPL-SCNC: 141 MMOL/L (ref 136–145)
TRIGL SERPL-MCNC: 213 MG/DL (ref 0–149)
VLDL: 43 MG/DL (ref 0–40)
WBC # BLD AUTO: 6.4 X10*3/UL (ref 4.4–11.3)

## 2024-04-10 PROCEDURE — 85027 COMPLETE CBC AUTOMATED: CPT

## 2024-04-10 PROCEDURE — 80053 COMPREHEN METABOLIC PANEL: CPT

## 2024-04-10 PROCEDURE — 80061 LIPID PANEL: CPT

## 2024-04-10 PROCEDURE — 36415 COLL VENOUS BLD VENIPUNCTURE: CPT

## 2024-05-06 DIAGNOSIS — I10 ESSENTIAL (PRIMARY) HYPERTENSION: ICD-10-CM

## 2024-05-06 DIAGNOSIS — E11.9 TYPE 2 DIABETES MELLITUS WITHOUT COMPLICATIONS (MULTI): ICD-10-CM

## 2024-05-06 RX ORDER — METFORMIN HYDROCHLORIDE 1000 MG/1
1000 TABLET ORAL
Qty: 180 TABLET | Refills: 3 | Status: SHIPPED | OUTPATIENT
Start: 2024-05-06

## 2024-05-06 RX ORDER — CARVEDILOL 12.5 MG/1
12.5 TABLET ORAL 2 TIMES DAILY
Qty: 180 TABLET | Refills: 1 | Status: SHIPPED | OUTPATIENT
Start: 2024-05-06

## 2024-05-13 DIAGNOSIS — I10 ESSENTIAL (PRIMARY) HYPERTENSION: ICD-10-CM

## 2024-05-13 DIAGNOSIS — E78.01 FAMILIAL HYPERCHOLESTEROLEMIA: ICD-10-CM

## 2024-05-13 RX ORDER — ATORVASTATIN CALCIUM 40 MG/1
40 TABLET, FILM COATED ORAL DAILY
Qty: 90 TABLET | Refills: 1 | Status: SHIPPED | OUTPATIENT
Start: 2024-05-13

## 2024-05-13 RX ORDER — HYDROCHLOROTHIAZIDE 25 MG/1
25 TABLET ORAL DAILY
Qty: 90 TABLET | Refills: 1 | Status: SHIPPED | OUTPATIENT
Start: 2024-05-13

## 2024-06-04 DIAGNOSIS — E11.9 TYPE 2 DIABETES MELLITUS WITHOUT COMPLICATIONS (MULTI): ICD-10-CM

## 2024-06-09 RX ORDER — GLIPIZIDE 10 MG/1
10 TABLET ORAL 2 TIMES DAILY
Qty: 180 TABLET | Refills: 3 | Status: SHIPPED | OUTPATIENT
Start: 2024-06-09

## 2024-07-31 ASSESSMENT — ENCOUNTER SYMPTOMS
APPETITE CHANGE: 0
POLYDIPSIA: 0
VOMITING: 0
DIARRHEA: 0
FEVER: 0
ABDOMINAL PAIN: 0
SHORTNESS OF BREATH: 0
PALPITATIONS: 1
NERVOUS/ANXIOUS: 0
ARTHRALGIAS: 0
HEADACHES: 0
CONSTIPATION: 0
COUGH: 0
NAUSEA: 1
FREQUENCY: 0
SORE THROAT: 0

## 2024-08-01 DIAGNOSIS — Z12.12 SCREENING FOR COLORECTAL CANCER: ICD-10-CM

## 2024-08-01 DIAGNOSIS — Z13.820 ENCOUNTER FOR SCREENING FOR OSTEOPOROSIS: Primary | ICD-10-CM

## 2024-08-01 DIAGNOSIS — Z12.11 SCREENING FOR COLORECTAL CANCER: ICD-10-CM

## 2024-08-01 DIAGNOSIS — Z12.31 ENCOUNTER FOR SCREENING MAMMOGRAM FOR MALIGNANT NEOPLASM OF BREAST: ICD-10-CM

## 2024-08-05 ASSESSMENT — LIFESTYLE VARIABLES
3_OR_MORE_DRINKS_PER_DAY: N
CURRENT_SMOKER: N

## 2024-08-07 ENCOUNTER — HOSPITAL ENCOUNTER (OUTPATIENT)
Dept: RADIOLOGY | Facility: HOSPITAL | Age: 69
End: 2024-08-07
Payer: MEDICARE

## 2024-08-07 ENCOUNTER — APPOINTMENT (OUTPATIENT)
Dept: RADIOLOGY | Facility: HOSPITAL | Age: 69
End: 2024-08-07
Payer: MEDICARE

## 2024-08-07 ASSESSMENT — LIFESTYLE VARIABLES
CURRENT_SMOKER: N
3_OR_MORE_DRINKS_PER_DAY: N

## 2024-08-09 ENCOUNTER — APPOINTMENT (OUTPATIENT)
Dept: PRIMARY CARE | Facility: CLINIC | Age: 69
End: 2024-08-09
Payer: MEDICARE

## 2024-08-15 ENCOUNTER — ANESTHESIA EVENT (OUTPATIENT)
Dept: OPERATING ROOM | Facility: HOSPITAL | Age: 69
End: 2024-08-15
Payer: MEDICARE

## 2024-08-15 RX ORDER — ACETAMINOPHEN 325 MG/1
650 TABLET ORAL EVERY 4 HOURS PRN
Status: CANCELLED | OUTPATIENT
Start: 2024-08-15

## 2024-08-15 RX ORDER — ALBUTEROL SULFATE 0.83 MG/ML
2.5 SOLUTION RESPIRATORY (INHALATION) ONCE AS NEEDED
Status: CANCELLED | OUTPATIENT
Start: 2024-08-15

## 2024-08-15 RX ORDER — ONDANSETRON HYDROCHLORIDE 2 MG/ML
8 INJECTION, SOLUTION INTRAVENOUS ONCE
Status: CANCELLED | OUTPATIENT
Start: 2024-08-15 | End: 2024-08-15

## 2024-08-16 ENCOUNTER — ANESTHESIA (OUTPATIENT)
Dept: OPERATING ROOM | Facility: HOSPITAL | Age: 69
End: 2024-08-16
Payer: MEDICARE

## 2024-08-16 ENCOUNTER — HOSPITAL ENCOUNTER (OUTPATIENT)
Dept: OPERATING ROOM | Facility: HOSPITAL | Age: 69
Setting detail: OUTPATIENT SURGERY
Discharge: HOME | End: 2024-08-16
Payer: MEDICARE

## 2024-08-16 VITALS
SYSTOLIC BLOOD PRESSURE: 129 MMHG | DIASTOLIC BLOOD PRESSURE: 59 MMHG | WEIGHT: 270.06 LBS | BODY MASS INDEX: 38.66 KG/M2 | OXYGEN SATURATION: 96 % | HEIGHT: 70 IN | TEMPERATURE: 97.7 F | RESPIRATION RATE: 15 BRPM | HEART RATE: 69 BPM

## 2024-08-16 DIAGNOSIS — Z12.11 SCREENING FOR COLORECTAL CANCER: ICD-10-CM

## 2024-08-16 DIAGNOSIS — Z12.12 SCREENING FOR COLORECTAL CANCER: ICD-10-CM

## 2024-08-16 LAB — GLUCOSE BLD MANUAL STRIP-MCNC: 130 MG/DL (ref 74–99)

## 2024-08-16 PROCEDURE — 3600000002 HC OR TIME - INITIAL BASE CHARGE - PROCEDURE LEVEL TWO: Performed by: ANESTHESIOLOGY

## 2024-08-16 PROCEDURE — G0121 COLON CA SCRN NOT HI RSK IND: HCPCS | Performed by: SURGERY

## 2024-08-16 PROCEDURE — 2500000004 HC RX 250 GENERAL PHARMACY W/ HCPCS (ALT 636 FOR OP/ED): Performed by: NURSE ANESTHETIST, CERTIFIED REGISTERED

## 2024-08-16 PROCEDURE — 3600000007 HC OR TIME - EACH INCREMENTAL 1 MINUTE - PROCEDURE LEVEL TWO: Performed by: ANESTHESIOLOGY

## 2024-08-16 PROCEDURE — 3700000001 HC GENERAL ANESTHESIA TIME - INITIAL BASE CHARGE: Performed by: ANESTHESIOLOGY

## 2024-08-16 PROCEDURE — 2500000005 HC RX 250 GENERAL PHARMACY W/O HCPCS: Performed by: NURSE ANESTHETIST, CERTIFIED REGISTERED

## 2024-08-16 PROCEDURE — 3700000002 HC GENERAL ANESTHESIA TIME - EACH INCREMENTAL 1 MINUTE: Performed by: ANESTHESIOLOGY

## 2024-08-16 PROCEDURE — 2500000004 HC RX 250 GENERAL PHARMACY W/ HCPCS (ALT 636 FOR OP/ED): Performed by: ANESTHESIOLOGY

## 2024-08-16 PROCEDURE — 82947 ASSAY GLUCOSE BLOOD QUANT: CPT

## 2024-08-16 RX ORDER — PROPOFOL 10 MG/ML
INJECTION, EMULSION INTRAVENOUS AS NEEDED
Status: DISCONTINUED | OUTPATIENT
Start: 2024-08-16 | End: 2024-08-16

## 2024-08-16 RX ORDER — MIDAZOLAM HYDROCHLORIDE 1 MG/ML
INJECTION INTRAMUSCULAR; INTRAVENOUS AS NEEDED
Status: DISCONTINUED | OUTPATIENT
Start: 2024-08-16 | End: 2024-08-16

## 2024-08-16 RX ORDER — SODIUM CHLORIDE, SODIUM LACTATE, POTASSIUM CHLORIDE, CALCIUM CHLORIDE 600; 310; 30; 20 MG/100ML; MG/100ML; MG/100ML; MG/100ML
100 INJECTION, SOLUTION INTRAVENOUS CONTINUOUS
Status: DISCONTINUED | OUTPATIENT
Start: 2024-08-16 | End: 2024-08-17 | Stop reason: HOSPADM

## 2024-08-16 RX ORDER — LIDOCAINE HYDROCHLORIDE 20 MG/ML
INJECTION, SOLUTION INFILTRATION; PERINEURAL AS NEEDED
Status: DISCONTINUED | OUTPATIENT
Start: 2024-08-16 | End: 2024-08-16

## 2024-08-16 SDOH — HEALTH STABILITY: MENTAL HEALTH: CURRENT SMOKER: 0

## 2024-08-16 ASSESSMENT — COLUMBIA-SUICIDE SEVERITY RATING SCALE - C-SSRS
2. HAVE YOU ACTUALLY HAD ANY THOUGHTS OF KILLING YOURSELF?: NO
1. IN THE PAST MONTH, HAVE YOU WISHED YOU WERE DEAD OR WISHED YOU COULD GO TO SLEEP AND NOT WAKE UP?: NO
6. HAVE YOU EVER DONE ANYTHING, STARTED TO DO ANYTHING, OR PREPARED TO DO ANYTHING TO END YOUR LIFE?: NO

## 2024-08-16 ASSESSMENT — PAIN - FUNCTIONAL ASSESSMENT: PAIN_FUNCTIONAL_ASSESSMENT: 0-10

## 2024-08-16 ASSESSMENT — PAIN SCALES - GENERAL
PAIN_LEVEL: 0
PAINLEVEL_OUTOF10: 0 - NO PAIN
PAINLEVEL_OUTOF10: 0 - NO PAIN

## 2024-08-16 NOTE — DISCHARGE INSTRUCTIONS
Patient Instructions after a Colonoscopy      The anesthetics, sedatives or narcotics which were given to you today will be acting in your body for the next 24 hours, so you might feel a little sleepy or groggy.  This feeling should slowly wear off. Carefully read and follow the instructions.     You received sedation today:  - Do not drive or operate any machinery or power tools of any kind.   - No alcoholic beverages today, not even beer or wine.  - Do not make any important decisions or sign any legal documents.  - No over the counter medications that contain alcohol or that may cause drowsiness.  - Do not make any important decisions or sign any legal documents.  - Make sure you have someone with you for first 24 hours.    While it is common to experience mild to moderate abdominal distention, gas, or belching after your procedure, if any of these symptoms occur following discharge from the GI Lab or within one week of having your procedure, call the [facility] to be advised whether a visit to your nearest Urgent Care or Emergency Department is indicated.  Take this paper with you if you go.     - If you develop an allergic reaction to the medications that were given during your procedure such as difficulty breathing, rash, hives, severe nausea, vomiting or lightheadedness.  - If you experience chest pain, shortness of breath, severe abdominal pain, fevers and chills.  -If you develop signs and symptoms of bleeding such as blood in your spit, if your stools turn black, tarry, or bloody  - If you have not urinated within 8 hours following your procedure.  - If your IV site becomes painful, red, inflamed, or looks infected.    If you received a biopsy/polypectomy/sphincterotomy the following instructions apply below:    __ Do not use Aspirin containing products, non-steroidal medications or anti-coagulants for one week following your procedure. (Examples of these types of medications are: Advil,  Arthrotec, Aleve, Coumadin, Ecotrin, Heparin, Ibuprofen, Indocin, Motrin, Naprosyn, Nuprin, Plavix, Vioxx, and Voltarin, or their generic forms.  This list is not all-inclusive.  Check with your physician or pharmacist before resuming medications.)   __ Eat a soft diet today.  Avoid foods that are poorly digested for the next 24 hours.  These foods would include: nuts, beans, lettuce, red meats, and fried foods. Start with liquids and advance your diet as tolerated, gradually work up to eating solids.   __ Do not have a Barium Study or Enema for one week.    Your physician recommends the additional following instructions:    -You have a contact number available for emergencies. The signs and symptoms of potential delayed complications were discussed with you. You may return to normal activities tomorrow.  -Resume your previous diet.  -Continue your present medications.   -We are waiting for your pathology results.  -Your physician has recommended a repeat colonoscopy (date to be determined after pending pathology results are reviewed) for surveillance based on pathology results.  -The findings and recommendations have been discussed with you.  -The findings and recommendations were discussed with your family.  - Please see Medication Reconciliation Form for new medication/medications prescribed.       If you experience any problems or have any questions following discharge from the GI Lab, please call:        Nurse Signature                                                                        Date___________________                                                                            Patient/Responsible Party Signature                                        Date___________________

## 2024-08-16 NOTE — ANESTHESIA PREPROCEDURE EVALUATION
Patient: Zonia Schaffer    Procedure Information       Anesthesia Start Date/Time: 08/16/24 0917    Scheduled providers: Evita Aaron MD    Procedure: COLONOSCOPY    Location: [facility] OR            Relevant Problems   Cardiac   (+) Abnormal EKG   (+) Benign essential hypertension   (+) Familial hypercholesteremia   (+) Other hyperlipidemia   (+) PVC (premature ventricular contraction)      Pulmonary   (+) Exertional shortness of breath      /Renal   (+) Acute UTI      Endocrine   (+) Obesity   (+) Type 2 diabetes mellitus with hyperglycemia, with long-term current use of insulin (Multi)      ID   (+) Acute UTI       Clinical information reviewed:    Allergies  Meds               NPO Detail:  NPO/Void Status  Date of Last Liquid: 08/15/24  Date of Last Solid: 08/15/24         Physical Exam    Airway  Mallampati: II     Cardiovascular - normal exam     Dental    Pulmonary    Abdominal            Anesthesia Plan    History of general anesthesia?: yes  History of complications of general anesthesia?: no    ASA 3     MAC     The patient is not a current smoker.  Patient was not previously instructed to abstain from smoking on day of procedure.  Patient did not smoke on day of procedure.  Education provided regarding risk of obstructive sleep apnea.  intravenous induction   Anesthetic plan and risks discussed with patient.    Plan discussed with CRNA.

## 2024-08-16 NOTE — ANESTHESIA POSTPROCEDURE EVALUATION
Patient: Zonia Schaffer    Procedure Summary       Date: 08/16/24 Room / Location: [facility] OR    Anesthesia Start: 0917 Anesthesia Stop: 0946    Procedure: COLONOSCOPY Diagnosis: Screening for colorectal cancer    Scheduled Providers: Evita Aaron MD Responsible Provider: Bowen Blair MD    Anesthesia Type: MAC ASA Status: Not recorded            Anesthesia Type: MAC    Vitals Value Taken Time   /55 08/16/24 0946   Temp 36.5 08/16/24 0946   Pulse 58 08/16/24 0946   Resp 17 08/16/24 0946   SpO2 99 08/16/24 0946       Anesthesia Post Evaluation    Patient location during evaluation: PACU  Patient participation: complete - patient participated  Level of consciousness: responsive to light touch  Pain score: 0  Pain management: adequate  Airway patency: patent  Two or more strategies used to mitigate risk of obstructive sleep apnea  Cardiovascular status: acceptable and stable  Respiratory status: acceptable and face mask  Hydration status: acceptable  Postoperative Nausea and Vomiting: none        There were no known notable events for this encounter.

## 2024-08-20 ENCOUNTER — HOSPITAL ENCOUNTER (OUTPATIENT)
Dept: RADIOLOGY | Facility: HOSPITAL | Age: 69
Discharge: HOME | End: 2024-08-20
Payer: MEDICARE

## 2024-08-20 VITALS — BODY MASS INDEX: 38.65 KG/M2 | WEIGHT: 270 LBS | HEIGHT: 70 IN

## 2024-08-20 DIAGNOSIS — Z13.820 ENCOUNTER FOR SCREENING FOR OSTEOPOROSIS: ICD-10-CM

## 2024-08-20 DIAGNOSIS — Z78.0 ASYMPTOMATIC MENOPAUSAL STATE: ICD-10-CM

## 2024-08-20 DIAGNOSIS — Z12.31 ENCOUNTER FOR SCREENING MAMMOGRAM FOR MALIGNANT NEOPLASM OF BREAST: ICD-10-CM

## 2024-08-20 PROCEDURE — 77080 DXA BONE DENSITY AXIAL: CPT | Performed by: RADIOLOGY

## 2024-08-20 PROCEDURE — 77080 DXA BONE DENSITY AXIAL: CPT

## 2024-08-20 PROCEDURE — 77067 SCR MAMMO BI INCL CAD: CPT

## 2024-08-20 PROCEDURE — 77067 SCR MAMMO BI INCL CAD: CPT | Performed by: RADIOLOGY

## 2024-08-20 PROCEDURE — 77063 BREAST TOMOSYNTHESIS BI: CPT | Performed by: RADIOLOGY

## 2024-08-20 ASSESSMENT — LIFESTYLE VARIABLES
CURRENT_SMOKER: N
3_OR_MORE_DRINKS_PER_DAY: N

## 2024-08-21 DIAGNOSIS — E11.9 TYPE 2 DIABETES MELLITUS WITHOUT COMPLICATIONS (MULTI): ICD-10-CM

## 2024-08-21 RX ORDER — CALCIUM CITRATE/VITAMIN D3 200MG-6.25
TABLET ORAL
Qty: 100 STRIP | Refills: 3 | Status: SHIPPED | OUTPATIENT
Start: 2024-08-21

## 2024-10-15 ENCOUNTER — APPOINTMENT (OUTPATIENT)
Dept: ENDOCRINOLOGY | Facility: CLINIC | Age: 69
End: 2024-10-15
Payer: MEDICARE

## 2024-10-15 VITALS
SYSTOLIC BLOOD PRESSURE: 158 MMHG | HEART RATE: 63 BPM | BODY MASS INDEX: 39.6 KG/M2 | HEIGHT: 70 IN | WEIGHT: 276.6 LBS | DIASTOLIC BLOOD PRESSURE: 79 MMHG

## 2024-10-15 DIAGNOSIS — Z79.4 TYPE 2 DIABETES MELLITUS WITHOUT COMPLICATION, WITH LONG-TERM CURRENT USE OF INSULIN (MULTI): ICD-10-CM

## 2024-10-15 DIAGNOSIS — E11.9 TYPE 2 DIABETES MELLITUS WITHOUT COMPLICATION, WITH LONG-TERM CURRENT USE OF INSULIN (MULTI): ICD-10-CM

## 2024-10-15 LAB — POC HEMOGLOBIN A1C: 6.8 % (ref 4.2–6.5)

## 2024-10-15 PROCEDURE — 3048F LDL-C <100 MG/DL: CPT | Performed by: INTERNAL MEDICINE

## 2024-10-15 PROCEDURE — 3078F DIAST BP <80 MM HG: CPT | Performed by: INTERNAL MEDICINE

## 2024-10-15 PROCEDURE — 99214 OFFICE O/P EST MOD 30 MIN: CPT | Performed by: INTERNAL MEDICINE

## 2024-10-15 PROCEDURE — 3077F SYST BP >= 140 MM HG: CPT | Performed by: INTERNAL MEDICINE

## 2024-10-15 PROCEDURE — 83036 HEMOGLOBIN GLYCOSYLATED A1C: CPT | Performed by: INTERNAL MEDICINE

## 2024-10-15 PROCEDURE — 1159F MED LIST DOCD IN RCRD: CPT | Performed by: INTERNAL MEDICINE

## 2024-10-15 PROCEDURE — 3008F BODY MASS INDEX DOCD: CPT | Performed by: INTERNAL MEDICINE

## 2024-10-15 PROCEDURE — 1160F RVW MEDS BY RX/DR IN RCRD: CPT | Performed by: INTERNAL MEDICINE

## 2024-10-15 PROCEDURE — 4010F ACE/ARB THERAPY RXD/TAKEN: CPT | Performed by: INTERNAL MEDICINE

## 2024-10-15 PROCEDURE — 1036F TOBACCO NON-USER: CPT | Performed by: INTERNAL MEDICINE

## 2024-10-15 RX ORDER — LANCETS 30 GAUGE
EACH MISCELLANEOUS
Qty: 100 EACH | Refills: 3 | Status: SHIPPED | OUTPATIENT
Start: 2024-10-15

## 2024-10-15 RX ORDER — LANCETS
EACH MISCELLANEOUS
Qty: 100 EACH | Refills: 3 | Status: SHIPPED | OUTPATIENT
Start: 2024-10-15

## 2024-10-15 ASSESSMENT — ENCOUNTER SYMPTOMS
CONSTIPATION: 0
POLYDIPSIA: 0
COUGH: 0
SHORTNESS OF BREATH: 0
LOSS OF SENSATION IN FEET: 0
DIARRHEA: 0
SORE THROAT: 0
FEVER: 0
NERVOUS/ANXIOUS: 0
NAUSEA: 0
ARTHRALGIAS: 1
FREQUENCY: 0
ABDOMINAL PAIN: 0
APPETITE CHANGE: 0
DEPRESSION: 0
VOMITING: 0
OCCASIONAL FEELINGS OF UNSTEADINESS: 1
HEADACHES: 0

## 2024-10-15 ASSESSMENT — PATIENT HEALTH QUESTIONNAIRE - PHQ9
1. LITTLE INTEREST OR PLEASURE IN DOING THINGS: NOT AT ALL
2. FEELING DOWN, DEPRESSED OR HOPELESS: NOT AT ALL
SUM OF ALL RESPONSES TO PHQ9 QUESTIONS 1 AND 2: 0

## 2024-10-15 NOTE — LETTER
October 15, 2024     Bijan Diaz DO  [address] [facility]  [address]  [facility] [address]    Patient: Zonia Schaffer   YOB: 1955   Date of Visit: 10/15/2024       Dear Dr. Bijan Diaz DO:    Thank you for referring Zonia Schaffer to me for evaluation. Below are my notes for this consultation.  If you have questions, please do not hesitate to call me. I look forward to following your patient along with you.       Sincerely,     John Juan MD      CC: No Recipients  ______________________________________________________________________________________    History Of Present Illness  Zonia Schaffer is a 68 y.o. female     Duration of type 2 diabetes mellitus:  17 years  Complications:  none    History of sciatica this summer, with limited mobility.    Rybelsus 7 mg/day, with nausea, no vomiting  Metformin 1000 mg BID  Glipizide 10 mg BID  Tresiba 94 units at bedtime     Intolerant to Farxiga due to vaginal itching     Patient is testing glucose 1 time daily  Records reviewed, on file    Last eye exam:  November 2023    Past Medical History  She has a past medical history of Personal history of other diseases of the circulatory system and Personal history of other endocrine, nutritional and metabolic disease.    Surgical History  She has a past surgical history that includes Hernia repair (02/18/2014).     Social History  She reports that she has never smoked. She has never used smokeless tobacco. She reports that she does not drink alcohol and does not use drugs.    Family History  Family History   Problem Relation Name Age of Onset   • Hypertension Mother     • Ovarian cancer Mother     • Other (cardiac disorder) Father     • Diabetes Other Grandmother        Medications  Current Outpatient Medications   Medication Instructions   • atorvastatin (LIPITOR) 40 mg, oral, Daily   • blood sugar diagnostic (True Metrix Glucose Test Strip) strip    • carvedilol (COREG) 12.5 mg, oral, 2 times daily   •  "cholecalciferol (Vitamin D-3) 125 MCG (5000 UT) capsule oral   • diphenhydrAMINE-acetaminophen (Tylenol PM Extra Strength)  mg per tablet 1 tablet, oral   • glipiZIDE (GLUCOTROL) 10 mg, oral, 2 times daily   • hydroCHLOROthiazide (HYDRODIURIL) 25 mg, oral, Daily   • lancets misc For glucose testing once daily   • lisinopril 40 mg tablet TAKE 1 TABLET BY MOUTH ONCE DAILY   • metFORMIN (GLUCOPHAGE) 1,000 mg, oral, 2 times daily (morning and late afternoon)   • Rybelsus 7 mg, oral, Daily   • Tresiba FlexTouch U-200 200 unit/mL (3 mL) injection INJECT 94 UNITS SUBCUTANEOUSLY AT BEDTIME AS DIRECTED   • True Metrix Glucose Test Strip strip Daily RT   • True Metrix Glucose Test Strip strip TEST ONCE DAILY.   • Unifine Pentips 31 gauge x 1/4\" needle Once daily       Allergies  Dapagliflozin, Sulfa (sulfonamide antibiotics), and Cortisone    Review of Systems   Constitutional:  Negative for appetite change and fever.   HENT:  Negative for sore throat.         Dry mouth     Eyes:  Negative for visual disturbance.        Dry eye   Respiratory:  Negative for cough and shortness of breath.    Cardiovascular:  Negative for chest pain.   Gastrointestinal:  Negative for abdominal pain, constipation, diarrhea, nausea and vomiting.   Endocrine: Negative for polydipsia and polyuria.   Genitourinary:  Negative for frequency.   Musculoskeletal:  Positive for arthralgias.   Skin:  Negative for rash.   Neurological:  Negative for headaches.   Psychiatric/Behavioral:  The patient is not nervous/anxious.          Last Recorded Vitals  Blood pressure 158/79, pulse 63, height 1.778 m (5' 10\"), weight 125 kg (276 lb 9.6 oz).    Physical Exam  Constitutional:       General: She is not in acute distress.  HENT:      Head: Normocephalic.      Mouth/Throat:      Mouth: Mucous membranes are moist.   Eyes:      Extraocular Movements: Extraocular movements intact.   Neck:      Thyroid: No thyroid mass or thyromegaly.   Cardiovascular:      " Pulses:           Radial pulses are 2+ on the right side and 2+ on the left side.   Musculoskeletal:      Right lower leg: No edema.      Left lower leg: No edema.   Feet:      Comments: Mild callus formation at heels  Lymphadenopathy:      Cervical: No cervical adenopathy.   Skin:     Comments: No foot sores   Neurological:      Mental Status: She is alert.      Motor: No tremor.   Psychiatric:         Mood and Affect: Affect normal.          Relevant Results  Glucose (mg/dL)   Date Value   04/10/2024 98   10/09/2023 135 (H)   02/01/2023 107 (H)   04/29/2022 301 (H)   01/13/2022 185 (H)     POC HEMOGLOBIN A1c (%)   Date Value   10/15/2024 6.8 (A)   04/09/2024 6.6 (A)     Hemoglobin A1C (%)   Date Value   10/09/2023 6.4 (H)   10/06/2021 7.3 (H)     Bicarbonate (mmol/L)   Date Value   04/10/2024 29   10/09/2023 31   02/01/2023 33 (H)   04/29/2022 29   01/13/2022 31     Urea Nitrogen (mg/dL)   Date Value   04/10/2024 25 (H)   10/09/2023 19   02/01/2023 22   04/29/2022 25 (H)   01/13/2022 25 (H)     Creatinine (mg/dL)   Date Value   04/10/2024 0.76   10/09/2023 0.67   02/01/2023 0.72   04/29/2022 0.85   01/13/2022 0.71     Lab Results   Component Value Date    CHOL 160 04/10/2024    CHOL 154 10/09/2023    CHOL 156 02/01/2023     Lab Results   Component Value Date    HDL 32.2 04/10/2024    HDL 35.3 10/09/2023    HDL 33.3 (A) 02/01/2023     Lab Results   Component Value Date    LDLCALC 85 04/10/2024    LDLCALC 75 (L) 10/09/2023     Lab Results   Component Value Date    TRIG 213 (H) 04/10/2024    TRIG 217 (H) 10/09/2023    TRIG 216 (H) 02/01/2023     IMPRESSION  TYPE 2 DIABETES MELLITUS   LONG TERM CURRENT INSULIN USE  Rapid A1c 6.8%  Excellent glucose control      RECOMMENDATIONS  Continue current program  Follow up 6 months  Bring written glucose record (2 weeks' worth) to all appointments.   Labs as ordered by Dr. Diaz.

## 2024-10-15 NOTE — PROGRESS NOTES
History Of Present Illness  Zonia Schaffer is a 68 y.o. female     Duration of type 2 diabetes mellitus:  17 years  Complications:  none    History of sciatica this summer, with limited mobility.    Rybelsus 7 mg/day, with nausea, no vomiting  Metformin 1000 mg BID  Glipizide 10 mg BID  Tresiba 94 units at bedtime     Intolerant to Farxiga due to vaginal itching     Patient is testing glucose 1 time daily  Records reviewed, on file    Last eye exam:  November 2023    Past Medical History  She has a past medical history of Personal history of other diseases of the circulatory system and Personal history of other endocrine, nutritional and metabolic disease.    Surgical History  She has a past surgical history that includes Hernia repair (02/18/2014).     Social History  She reports that she has never smoked. She has never used smokeless tobacco. She reports that she does not drink alcohol and does not use drugs.    Family History  Family History   Problem Relation Name Age of Onset    Hypertension Mother      Ovarian cancer Mother      Other (cardiac disorder) Father      Diabetes Other Grandmother        Medications  Current Outpatient Medications   Medication Instructions    atorvastatin (LIPITOR) 40 mg, oral, Daily    blood sugar diagnostic (True Metrix Glucose Test Strip) strip     carvedilol (COREG) 12.5 mg, oral, 2 times daily    cholecalciferol (Vitamin D-3) 125 MCG (5000 UT) capsule oral    diphenhydrAMINE-acetaminophen (Tylenol PM Extra Strength)  mg per tablet 1 tablet, oral    glipiZIDE (GLUCOTROL) 10 mg, oral, 2 times daily    hydroCHLOROthiazide (HYDRODIURIL) 25 mg, oral, Daily    lancets misc For glucose testing once daily    lisinopril 40 mg tablet TAKE 1 TABLET BY MOUTH ONCE DAILY    metFORMIN (GLUCOPHAGE) 1,000 mg, oral, 2 times daily (morning and late afternoon)    Rybelsus 7 mg, oral, Daily    Tresiba FlexTouch U-200 200 unit/mL (3 mL) injection INJECT 94 UNITS SUBCUTANEOUSLY AT BEDTIME AS  "DIRECTED    True Metrix Glucose Test Strip strip Daily RT    True Metrix Glucose Test Strip strip TEST ONCE DAILY.    Unifine Pentips 31 gauge x 1/4\" needle Once daily       Allergies  Dapagliflozin, Sulfa (sulfonamide antibiotics), and Cortisone    Review of Systems   Constitutional:  Negative for appetite change and fever.   HENT:  Negative for sore throat.         Dry mouth     Eyes:  Negative for visual disturbance.        Dry eye   Respiratory:  Negative for cough and shortness of breath.    Cardiovascular:  Negative for chest pain.   Gastrointestinal:  Negative for abdominal pain, constipation, diarrhea, nausea and vomiting.   Endocrine: Negative for polydipsia and polyuria.   Genitourinary:  Negative for frequency.   Musculoskeletal:  Positive for arthralgias.   Skin:  Negative for rash.   Neurological:  Negative for headaches.   Psychiatric/Behavioral:  The patient is not nervous/anxious.          Last Recorded Vitals  Blood pressure 158/79, pulse 63, height 1.778 m (5' 10\"), weight 125 kg (276 lb 9.6 oz).    Physical Exam  Constitutional:       General: She is not in acute distress.  HENT:      Head: Normocephalic.      Mouth/Throat:      Mouth: Mucous membranes are moist.   Eyes:      Extraocular Movements: Extraocular movements intact.   Neck:      Thyroid: No thyroid mass or thyromegaly.   Cardiovascular:      Pulses:           Radial pulses are 2+ on the right side and 2+ on the left side.   Musculoskeletal:      Right lower leg: No edema.      Left lower leg: No edema.   Feet:      Comments: Mild callus formation at heels  Lymphadenopathy:      Cervical: No cervical adenopathy.   Skin:     Comments: No foot sores   Neurological:      Mental Status: She is alert.      Motor: No tremor.   Psychiatric:         Mood and Affect: Affect normal.          Relevant Results  Glucose (mg/dL)   Date Value   04/10/2024 98   10/09/2023 135 (H)   02/01/2023 107 (H)   04/29/2022 301 (H)   01/13/2022 185 (H)     POC " HEMOGLOBIN A1c (%)   Date Value   10/15/2024 6.8 (A)   04/09/2024 6.6 (A)     Hemoglobin A1C (%)   Date Value   10/09/2023 6.4 (H)   10/06/2021 7.3 (H)     Bicarbonate (mmol/L)   Date Value   04/10/2024 29   10/09/2023 31   02/01/2023 33 (H)   04/29/2022 29   01/13/2022 31     Urea Nitrogen (mg/dL)   Date Value   04/10/2024 25 (H)   10/09/2023 19   02/01/2023 22   04/29/2022 25 (H)   01/13/2022 25 (H)     Creatinine (mg/dL)   Date Value   04/10/2024 0.76   10/09/2023 0.67   02/01/2023 0.72   04/29/2022 0.85   01/13/2022 0.71     Lab Results   Component Value Date    CHOL 160 04/10/2024    CHOL 154 10/09/2023    CHOL 156 02/01/2023     Lab Results   Component Value Date    HDL 32.2 04/10/2024    HDL 35.3 10/09/2023    HDL 33.3 (A) 02/01/2023     Lab Results   Component Value Date    LDLCALC 85 04/10/2024    LDLCALC 75 (L) 10/09/2023     Lab Results   Component Value Date    TRIG 213 (H) 04/10/2024    TRIG 217 (H) 10/09/2023    TRIG 216 (H) 02/01/2023     IMPRESSION  TYPE 2 DIABETES MELLITUS   LONG TERM CURRENT INSULIN USE  Rapid A1c 6.8%  Excellent glucose control      RECOMMENDATIONS  Continue current program  Follow up 6 months  Bring written glucose record (2 weeks' worth) to all appointments.   Labs as ordered by Dr. Diaz.

## 2024-10-15 NOTE — PATIENT INSTRUCTIONS
A1c 6.8%    Follow up 6 months  Bring written glucose record (2 weeks' worth) to all appointments.   Labs as ordered by Dr. Diaz.

## 2024-10-20 DIAGNOSIS — I10 ESSENTIAL (PRIMARY) HYPERTENSION: ICD-10-CM

## 2024-10-23 RX ORDER — CARVEDILOL 12.5 MG/1
12.5 TABLET ORAL 2 TIMES DAILY
Qty: 180 TABLET | Refills: 1 | Status: SHIPPED | OUTPATIENT
Start: 2024-10-23

## 2024-10-23 RX ORDER — HYDROCHLOROTHIAZIDE 25 MG/1
25 TABLET ORAL DAILY
Qty: 90 TABLET | Refills: 1 | Status: SHIPPED | OUTPATIENT
Start: 2024-10-23

## 2024-10-30 DIAGNOSIS — E78.01 FAMILIAL HYPERCHOLESTEROLEMIA: ICD-10-CM

## 2024-10-31 RX ORDER — ATORVASTATIN CALCIUM 40 MG/1
40 TABLET, FILM COATED ORAL DAILY
Qty: 90 TABLET | Refills: 0 | Status: SHIPPED | OUTPATIENT
Start: 2024-10-31

## 2024-11-14 ENCOUNTER — APPOINTMENT (OUTPATIENT)
Dept: PRIMARY CARE | Facility: CLINIC | Age: 69
End: 2024-11-14
Payer: MEDICARE

## 2024-11-14 VITALS
SYSTOLIC BLOOD PRESSURE: 130 MMHG | HEART RATE: 73 BPM | WEIGHT: 279 LBS | BODY MASS INDEX: 40.03 KG/M2 | OXYGEN SATURATION: 95 % | DIASTOLIC BLOOD PRESSURE: 74 MMHG

## 2024-11-14 DIAGNOSIS — E78.01 FAMILIAL HYPERCHOLESTEREMIA: Primary | ICD-10-CM

## 2024-11-14 DIAGNOSIS — E11.65 TYPE 2 DIABETES MELLITUS WITH HYPERGLYCEMIA, WITH LONG-TERM CURRENT USE OF INSULIN: ICD-10-CM

## 2024-11-14 DIAGNOSIS — Z79.4 TYPE 2 DIABETES MELLITUS WITH HYPERGLYCEMIA, WITH LONG-TERM CURRENT USE OF INSULIN: ICD-10-CM

## 2024-11-14 DIAGNOSIS — L30.4 INTERTRIGO: ICD-10-CM

## 2024-11-14 PROCEDURE — 4010F ACE/ARB THERAPY RXD/TAKEN: CPT | Performed by: INTERNAL MEDICINE

## 2024-11-14 PROCEDURE — 1123F ACP DISCUSS/DSCN MKR DOCD: CPT | Performed by: INTERNAL MEDICINE

## 2024-11-14 PROCEDURE — 99214 OFFICE O/P EST MOD 30 MIN: CPT | Performed by: INTERNAL MEDICINE

## 2024-11-14 PROCEDURE — 3075F SYST BP GE 130 - 139MM HG: CPT | Performed by: INTERNAL MEDICINE

## 2024-11-14 PROCEDURE — 3078F DIAST BP <80 MM HG: CPT | Performed by: INTERNAL MEDICINE

## 2024-11-14 PROCEDURE — 1159F MED LIST DOCD IN RCRD: CPT | Performed by: INTERNAL MEDICINE

## 2024-11-14 PROCEDURE — 3048F LDL-C <100 MG/DL: CPT | Performed by: INTERNAL MEDICINE

## 2024-11-14 PROCEDURE — 1036F TOBACCO NON-USER: CPT | Performed by: INTERNAL MEDICINE

## 2024-11-14 PROCEDURE — 1158F ADVNC CARE PLAN TLK DOCD: CPT | Performed by: INTERNAL MEDICINE

## 2024-11-14 RX ORDER — KETOCONAZOLE 20 MG/G
CREAM TOPICAL 2 TIMES DAILY
Qty: 30 G | Refills: 0 | Status: SHIPPED | OUTPATIENT
Start: 2024-11-14

## 2024-11-14 NOTE — PROGRESS NOTES
Subjective   Patient ID: Zonia Schaffer is a 69 y.o. female who presents for Follow-up.    HPI     Patient stated she has a rash under the left breast. She stated that it started around a week ago, only located under her left breast. Patient stated that she does not feel pain. Patient mentioned that the rash seems increasing in size and no discharges are coming out from it. Patient mentioned putting alcohol, powder and antibiotics but non of it helped.     No CP, SOB, JOHNSON or LE edema.     Patient stated that she needed to lose weight to be able to do the hip surgery in spring.      Objective   /74   Pulse 73   Wt 127 kg (279 lb)   SpO2 95%   BMI 40.03 kg/m²     Physical Exam  Constitutional:       Appearance: Normal appearance.   HENT:      Head: Normocephalic and atraumatic.   Cardiovascular:      Rate and Rhythm: Normal rate and regular rhythm.      Heart sounds: Normal heart sounds. No murmur heard.     No gallop.   Pulmonary:      Effort: Pulmonary effort is normal. No respiratory distress.      Breath sounds: No wheezing or rales.   Chest:   Breasts:     Left: No tenderness.   Musculoskeletal:      Comments: Trace b/l LE edma, purplish discoloration of b/l LE    Skin:     General: Skin is warm and dry.      Findings: Rash present.      Comments: Rash under the left breast   Neurological:      Mental Status: She is alert and oriented to person, place, and time. Mental status is at baseline.   Psychiatric:         Mood and Affect: Mood normal.         Behavior: Behavior normal.         Assessment/Plan     #HTN  -Averaging 130/70s. Cont carvedilol to 12.5mg BID, lisinopril 40mg daily, HCTZ 25mg daily.      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 Following with cardiology.  Cont carvedilol      #HLD  -Cont atorva 40mg daily , LHC 10/21 with trivial nonob CAD   - ordered lipid panel     #DM2  -Follows with Dr. Juan. Cont Rybelsus, , Tresiba 94 U daily, metformin 1000mg bid  and glipizide 10mg bid   - ordered cbc, cmp, albumine urine    #b/l LE venous insufficiency   -Stable    #intertrigo under the left breast  - start ketoconazole 2% twice a day fpr 10-14 days      Mamm, DEXA  and  UTD      Will get Shingrix.      RTC 6 mo     Jacqueline Coronado M.D.   Internal Medicine, PGY 1     I saw and evaluated the patient. I personally obtained the key and critical portions of the history and physical exam or was physically present for key and critical portions performed by the resident/fellow. I reviewed the resident/fellow's documentation and discussed the patient with the resident/fellow. I agree with the resident/fellow's medical decision making as documented in the note.    Bijan Diaz, DO

## 2024-11-14 NOTE — PATIENT INSTRUCTIONS
Thank you for your visit today:    - please get your labs done--fasting   - For the rash, start ketoconazole 2% twice a day fpr 10-14 days  - Will see you in 6 months

## 2024-11-14 NOTE — PROGRESS NOTES
Subjective   Patient ID: Zonia Schaffer is a 69 y.o. female who presents for Follow-up. Rash underneath breast     HPI     Review of Systems    Objective   There were no vitals taken for this visit.    Physical Exam    Assessment/Plan

## 2024-11-24 DIAGNOSIS — I10 ESSENTIAL (PRIMARY) HYPERTENSION: ICD-10-CM

## 2024-11-26 RX ORDER — LISINOPRIL 40 MG/1
TABLET ORAL
Qty: 90 TABLET | Refills: 2 | Status: SHIPPED | OUTPATIENT
Start: 2024-11-26

## 2025-02-05 LAB
ALBUMIN SERPL-MCNC: 4.2 G/DL (ref 3.6–5.1)
ALBUMIN/CREAT UR: 10 MG/G CREAT
ALP SERPL-CCNC: 60 U/L (ref 37–153)
ALT SERPL-CCNC: 11 U/L (ref 6–29)
ANION GAP SERPL CALCULATED.4IONS-SCNC: 9 MMOL/L (CALC) (ref 7–17)
AST SERPL-CCNC: 13 U/L (ref 10–35)
BILIRUB SERPL-MCNC: 0.5 MG/DL (ref 0.2–1.2)
BUN SERPL-MCNC: 22 MG/DL (ref 7–25)
CALCIUM SERPL-MCNC: 9.5 MG/DL (ref 8.6–10.4)
CHLORIDE SERPL-SCNC: 102 MMOL/L (ref 98–110)
CHOLEST SERPL-MCNC: 150 MG/DL
CHOLEST/HDLC SERPL: 4.2 (CALC)
CO2 SERPL-SCNC: 31 MMOL/L (ref 20–32)
CREAT SERPL-MCNC: 0.7 MG/DL (ref 0.5–1.05)
CREAT UR-MCNC: 148 MG/DL (ref 20–275)
EGFRCR SERPLBLD CKD-EPI 2021: 95 ML/MIN/1.73M2
ERYTHROCYTE [DISTWIDTH] IN BLOOD BY AUTOMATED COUNT: 13.3 % (ref 11–15)
GLUCOSE SERPL-MCNC: 113 MG/DL (ref 65–99)
HCT VFR BLD AUTO: 42.7 % (ref 35–45)
HDLC SERPL-MCNC: 36 MG/DL
HGB BLD-MCNC: 13.3 G/DL (ref 11.7–15.5)
LDLC SERPL CALC-MCNC: 89 MG/DL (CALC)
MCH RBC QN AUTO: 28.1 PG (ref 27–33)
MCHC RBC AUTO-ENTMCNC: 31.1 G/DL (ref 32–36)
MCV RBC AUTO: 90.3 FL (ref 80–100)
MICROALBUMIN UR-MCNC: 1.5 MG/DL
NONHDLC SERPL-MCNC: 114 MG/DL (CALC)
PLATELET # BLD AUTO: 212 THOUSAND/UL (ref 140–400)
PMV BLD REES-ECKER: 11.6 FL (ref 7.5–12.5)
POTASSIUM SERPL-SCNC: 4.7 MMOL/L (ref 3.5–5.3)
PROT SERPL-MCNC: 6.5 G/DL (ref 6.1–8.1)
RBC # BLD AUTO: 4.73 MILLION/UL (ref 3.8–5.1)
SODIUM SERPL-SCNC: 142 MMOL/L (ref 135–146)
TRIGL SERPL-MCNC: 155 MG/DL
WBC # BLD AUTO: 6 THOUSAND/UL (ref 3.8–10.8)

## 2025-02-06 DIAGNOSIS — E78.01 FAMILIAL HYPERCHOLESTEROLEMIA: ICD-10-CM

## 2025-02-06 RX ORDER — ATORVASTATIN CALCIUM 40 MG/1
40 TABLET, FILM COATED ORAL DAILY
Qty: 90 TABLET | Refills: 1 | Status: SHIPPED | OUTPATIENT
Start: 2025-02-06 | End: 2025-02-08 | Stop reason: SDUPTHER

## 2025-02-08 DIAGNOSIS — E78.01 FAMILIAL HYPERCHOLESTEROLEMIA: ICD-10-CM

## 2025-02-08 RX ORDER — ATORVASTATIN CALCIUM 80 MG/1
80 TABLET, FILM COATED ORAL DAILY
Qty: 90 TABLET | Refills: 1 | Status: SHIPPED | OUTPATIENT
Start: 2025-02-08

## 2025-04-08 DIAGNOSIS — E11.9 TYPE 2 DIABETES MELLITUS WITHOUT COMPLICATION, WITH LONG-TERM CURRENT USE OF INSULIN: ICD-10-CM

## 2025-04-08 DIAGNOSIS — Z79.4 TYPE 2 DIABETES MELLITUS WITHOUT COMPLICATION, WITH LONG-TERM CURRENT USE OF INSULIN: ICD-10-CM

## 2025-04-08 RX ORDER — ORAL SEMAGLUTIDE 7 MG/1
7 TABLET ORAL DAILY
Qty: 30 TABLET | Refills: 11 | Status: SHIPPED | OUTPATIENT
Start: 2025-04-08

## 2025-04-09 ENCOUNTER — APPOINTMENT (OUTPATIENT)
Dept: ENDOCRINOLOGY | Facility: CLINIC | Age: 70
End: 2025-04-09
Payer: MEDICARE

## 2025-04-09 VITALS
HEIGHT: 70 IN | WEIGHT: 275 LBS | SYSTOLIC BLOOD PRESSURE: 191 MMHG | HEART RATE: 61 BPM | DIASTOLIC BLOOD PRESSURE: 86 MMHG | BODY MASS INDEX: 39.37 KG/M2

## 2025-04-09 DIAGNOSIS — E11.9 TYPE 2 DIABETES MELLITUS WITHOUT COMPLICATION, WITH LONG-TERM CURRENT USE OF INSULIN: ICD-10-CM

## 2025-04-09 DIAGNOSIS — Z79.4 TYPE 2 DIABETES MELLITUS WITHOUT COMPLICATION, WITH LONG-TERM CURRENT USE OF INSULIN: ICD-10-CM

## 2025-04-09 LAB — POC HEMOGLOBIN A1C: 6.2 % (ref 4.2–6.5)

## 2025-04-09 PROCEDURE — 3044F HG A1C LEVEL LT 7.0%: CPT | Performed by: INTERNAL MEDICINE

## 2025-04-09 PROCEDURE — 3079F DIAST BP 80-89 MM HG: CPT | Performed by: INTERNAL MEDICINE

## 2025-04-09 PROCEDURE — 83036 HEMOGLOBIN GLYCOSYLATED A1C: CPT | Performed by: INTERNAL MEDICINE

## 2025-04-09 PROCEDURE — G2211 COMPLEX E/M VISIT ADD ON: HCPCS | Performed by: INTERNAL MEDICINE

## 2025-04-09 PROCEDURE — 1160F RVW MEDS BY RX/DR IN RCRD: CPT | Performed by: INTERNAL MEDICINE

## 2025-04-09 PROCEDURE — 4010F ACE/ARB THERAPY RXD/TAKEN: CPT | Performed by: INTERNAL MEDICINE

## 2025-04-09 PROCEDURE — 1126F AMNT PAIN NOTED NONE PRSNT: CPT | Performed by: INTERNAL MEDICINE

## 2025-04-09 PROCEDURE — 1036F TOBACCO NON-USER: CPT | Performed by: INTERNAL MEDICINE

## 2025-04-09 PROCEDURE — 3008F BODY MASS INDEX DOCD: CPT | Performed by: INTERNAL MEDICINE

## 2025-04-09 PROCEDURE — 1123F ACP DISCUSS/DSCN MKR DOCD: CPT | Performed by: INTERNAL MEDICINE

## 2025-04-09 PROCEDURE — 1159F MED LIST DOCD IN RCRD: CPT | Performed by: INTERNAL MEDICINE

## 2025-04-09 PROCEDURE — 3077F SYST BP >= 140 MM HG: CPT | Performed by: INTERNAL MEDICINE

## 2025-04-09 PROCEDURE — 99214 OFFICE O/P EST MOD 30 MIN: CPT | Performed by: INTERNAL MEDICINE

## 2025-04-09 RX ORDER — CALCIUM CITRATE/VITAMIN D3 200MG-6.25
TABLET ORAL
Qty: 100 STRIP | Refills: 3 | Status: SHIPPED | OUTPATIENT
Start: 2025-04-09

## 2025-04-09 ASSESSMENT — ENCOUNTER SYMPTOMS
ROS SKIN COMMENTS: DRY
COUGH: 0
ARTHRALGIAS: 1
DIARRHEA: 0
CONSTIPATION: 0
SHORTNESS OF BREATH: 0
VOMITING: 0
HEADACHES: 0
ABDOMINAL PAIN: 0
NERVOUS/ANXIOUS: 0
NAUSEA: 0
APPETITE CHANGE: 0
FEVER: 0
FREQUENCY: 0
SORE THROAT: 0
POLYDIPSIA: 0

## 2025-04-09 ASSESSMENT — PAIN SCALES - GENERAL: PAINLEVEL_OUTOF10: 0-NO PAIN

## 2025-04-09 NOTE — PATIENT INSTRUCTIONS
A1c 6.2%    RECOMMENDATIONS  Continue current program  Follow up 6 months  Labs as ordered by Dr. Diaz  Bring written glucose record (2 weeks' worth) to all appointments.

## 2025-04-09 NOTE — LETTER
April 9, 2025     Bijan Diaz DO  [address] [facility]  [address]  [facility] [address]    Patient: Zonia Schaffer   YOB: 1955   Date of Visit: 4/9/2025       Dear Dr. Bijan Diaz DO:    Thank you for referring Zonia Schaffer to me for evaluation. Below are my notes for this consultation.  If you have questions, please do not hesitate to call me. I look forward to following your patient along with you.       Sincerely,     John Juan MD      CC: No Recipients  ______________________________________________________________________________________    History Of Present Illness  Zonia Schaffer is a 69 y.o. female     Duration of type 2 diabetes mellitus:  17 years  Complications:  none      Rybelsus 7 mg/day, with nausea, no vomiting  Metformin 1000 mg BID  Glipizide 10 mg BID  Tresiba 94 units at bedtime     Intolerant to Farxiga due to vaginal itching      Patient is testing glucose 1 time daily  Records reviewed, on file     Last eye exam:  3/24/25    Home /78 today      Past Medical History  She has a past medical history of Personal history of other diseases of the circulatory system and Personal history of other endocrine, nutritional and metabolic disease.    Surgical History  She has a past surgical history that includes Hernia repair (02/18/2014).     Social History  She reports that she has never smoked. She has never used smokeless tobacco. She reports that she does not drink alcohol and does not use drugs.    Family History  Family History   Problem Relation Name Age of Onset   • Hypertension Mother     • Ovarian cancer Mother     • Other (cardiac disorder) Father     • Diabetes Other Grandmother        Medications  Current Outpatient Medications   Medication Instructions   • atorvastatin (LIPITOR) 80 mg, oral, Daily   • carvedilol (COREG) 12.5 mg, oral, 2 times daily   • cholecalciferol (Vitamin D-3) 125 MCG (5000 UT) capsule Take by mouth.   • diphenhydrAMINE-acetaminophen (Tylenol PM  "Extra Strength)  mg per tablet 1 tablet   • glipiZIDE (GLUCOTROL) 10 mg, oral, 2 times daily   • hydroCHLOROthiazide (HYDRODIURIL) 25 mg, oral, Daily   • ketoconazole (NIZOral) 2 % cream Topical, 2 times daily   • lancets misc 10For glucose testing once daily   • lisinopril 40 mg tablet TAKE 1 TABLET BY MOUTH ONCE DAILY   • metFORMIN (GLUCOPHAGE) 1,000 mg, oral, 2 times daily (morning and late afternoon)   • Rybelsus 7 mg, oral, Daily   • Tresiba FlexTouch U-200 200 unit/mL (3 mL) injection INJECT 94 UNITS SUBCUTANEOUSLY AT BEDTIME AS DIRECTED   • True Metrix Glucose Test Strip strip TEST ONCE DAILY.   • Unifine Pentips 31 gauge x 1/4\" needle Once daily       Allergies  Dapagliflozin, Sulfa (sulfonamide antibiotics), and Cortisone    Review of Systems   Constitutional:  Negative for appetite change and fever.   HENT:  Negative for sore throat.         Dry mouth   Eyes:  Negative for visual disturbance.   Respiratory:  Negative for cough and shortness of breath.    Cardiovascular:  Negative for chest pain.   Gastrointestinal:  Negative for abdominal pain, constipation, diarrhea, nausea and vomiting.   Endocrine: Negative for polydipsia and polyuria.   Genitourinary:  Negative for frequency.   Musculoskeletal:  Positive for arthralgias.   Skin:  Negative for rash.        Dry   Neurological:  Negative for headaches.   Psychiatric/Behavioral:  The patient is not nervous/anxious.          Last Recorded Vitals  Blood pressure (!) 191/86, pulse 61, height 1.778 m (5' 10\"), weight 125 kg (275 lb).    Physical Exam  Constitutional:       General: She is not in acute distress.  HENT:      Head: Normocephalic.      Mouth/Throat:      Mouth: Mucous membranes are moist.   Eyes:      Extraocular Movements: Extraocular movements intact.   Neck:      Thyroid: No thyroid mass or thyromegaly.   Cardiovascular:      Pulses:           Radial pulses are 2+ on the right side and 2+ on the left side.   Musculoskeletal:      Right " lower leg: No edema.      Left lower leg: No edema.      Right foot: No deformity.      Left foot: No deformity.   Feet:      Comments: Mild callus formation at heels  Lymphadenopathy:      Cervical: No cervical adenopathy.   Skin:     Comments: No foot sores   Neurological:      Mental Status: She is alert.      Motor: No tremor.   Psychiatric:         Mood and Affect: Affect normal.          Relevant Results  GLUCOSE (mg/dL)   Date Value   02/04/2025 113 (H)     Glucose (mg/dL)   Date Value   04/10/2024 98   10/09/2023 135 (H)   02/01/2023 107 (H)   04/29/2022 301 (H)   01/13/2022 185 (H)     POC HEMOGLOBIN A1c (%)   Date Value   10/15/2024 6.8 (A)   04/09/2024 6.6 (A)     Hemoglobin A1C (%)   Date Value   10/09/2023 6.4 (H)   10/06/2021 7.3 (H)     CARBON DIOXIDE (mmol/L)   Date Value   02/04/2025 31     Bicarbonate (mmol/L)   Date Value   04/10/2024 29   10/09/2023 31   02/01/2023 33 (H)   04/29/2022 29   01/13/2022 31     UREA NITROGEN (BUN) (mg/dL)   Date Value   02/04/2025 22     Urea Nitrogen (mg/dL)   Date Value   04/10/2024 25 (H)   10/09/2023 19   02/01/2023 22   04/29/2022 25 (H)   01/13/2022 25 (H)     Creatinine (mg/dL)   Date Value   04/10/2024 0.76   10/09/2023 0.67   02/01/2023 0.72   04/29/2022 0.85   01/13/2022 0.71     CREATININE (mg/dL)   Date Value   02/04/2025 0.70     Lab Results   Component Value Date    CHOL 150 02/04/2025    CHOL 160 04/10/2024    CHOL 154 10/09/2023     Lab Results   Component Value Date    HDL 36 (L) 02/04/2025    HDL 32.2 04/10/2024    HDL 35.3 10/09/2023     Lab Results   Component Value Date    LDLCALC 89 02/04/2025    LDLCALC 85 04/10/2024    LDLCALC 75 (L) 10/09/2023     Lab Results   Component Value Date    TRIG 155 (H) 02/04/2025    TRIG 213 (H) 04/10/2024    TRIG 217 (H) 10/09/2023       IMPRESSION  TYPE 2 DIABETES MELLITUS   LONG TERM CURRENT INSULIN USE  Rapid A1c 6.2%  Excellent glucose control      RECOMMENDATIONS  Continue current program  Follow up 6  months  Labs as ordered by Dr. Diaz  Bring written glucose record (2 weeks' worth) to all appointments.

## 2025-04-09 NOTE — PROGRESS NOTES
History Of Present Illness  Zonia Schaffer is a 69 y.o. female     Duration of type 2 diabetes mellitus:  17 years  Complications:  none      Rybelsus 7 mg/day, with nausea, no vomiting  Metformin 1000 mg BID  Glipizide 10 mg BID  Tresiba 94 units at bedtime     Intolerant to Farxiga due to vaginal itching      Patient is testing glucose 1 time daily  Records reviewed, on file     Last eye exam:  3/24/25    Home /78 today      Past Medical History  She has a past medical history of Personal history of other diseases of the circulatory system and Personal history of other endocrine, nutritional and metabolic disease.    Surgical History  She has a past surgical history that includes Hernia repair (02/18/2014).     Social History  She reports that she has never smoked. She has never used smokeless tobacco. She reports that she does not drink alcohol and does not use drugs.    Family History  Family History   Problem Relation Name Age of Onset    Hypertension Mother      Ovarian cancer Mother      Other (cardiac disorder) Father      Diabetes Other Grandmother        Medications  Current Outpatient Medications   Medication Instructions    atorvastatin (LIPITOR) 80 mg, oral, Daily    carvedilol (COREG) 12.5 mg, oral, 2 times daily    cholecalciferol (Vitamin D-3) 125 MCG (5000 UT) capsule Take by mouth.    diphenhydrAMINE-acetaminophen (Tylenol PM Extra Strength)  mg per tablet 1 tablet    glipiZIDE (GLUCOTROL) 10 mg, oral, 2 times daily    hydroCHLOROthiazide (HYDRODIURIL) 25 mg, oral, Daily    ketoconazole (NIZOral) 2 % cream Topical, 2 times daily    lancets misc 10For glucose testing once daily    lisinopril 40 mg tablet TAKE 1 TABLET BY MOUTH ONCE DAILY    metFORMIN (GLUCOPHAGE) 1,000 mg, oral, 2 times daily (morning and late afternoon)    Rybelsus 7 mg, oral, Daily    Tresiba FlexTouch U-200 200 unit/mL (3 mL) injection INJECT 94 UNITS SUBCUTANEOUSLY AT BEDTIME AS DIRECTED    True Metrix Glucose Test  "Strip strip TEST ONCE DAILY.    Unifine Pentips 31 gauge x 1/4\" needle Once daily       Allergies  Dapagliflozin, Sulfa (sulfonamide antibiotics), and Cortisone    Review of Systems   Constitutional:  Negative for appetite change and fever.   HENT:  Negative for sore throat.         Dry mouth   Eyes:  Negative for visual disturbance.   Respiratory:  Negative for cough and shortness of breath.    Cardiovascular:  Negative for chest pain.   Gastrointestinal:  Negative for abdominal pain, constipation, diarrhea, nausea and vomiting.   Endocrine: Negative for polydipsia and polyuria.   Genitourinary:  Negative for frequency.   Musculoskeletal:  Positive for arthralgias.   Skin:  Negative for rash.        Dry   Neurological:  Negative for headaches.   Psychiatric/Behavioral:  The patient is not nervous/anxious.          Last Recorded Vitals  Blood pressure (!) 191/86, pulse 61, height 1.778 m (5' 10\"), weight 125 kg (275 lb).    Physical Exam  Constitutional:       General: She is not in acute distress.  HENT:      Head: Normocephalic.      Mouth/Throat:      Mouth: Mucous membranes are moist.   Eyes:      Extraocular Movements: Extraocular movements intact.   Neck:      Thyroid: No thyroid mass or thyromegaly.   Cardiovascular:      Pulses:           Radial pulses are 2+ on the right side and 2+ on the left side.   Musculoskeletal:      Right lower leg: No edema.      Left lower leg: No edema.      Right foot: No deformity.      Left foot: No deformity.   Feet:      Comments: Mild callus formation at heels  Lymphadenopathy:      Cervical: No cervical adenopathy.   Skin:     Comments: No foot sores   Neurological:      Mental Status: She is alert.      Motor: No tremor.   Psychiatric:         Mood and Affect: Affect normal.          Relevant Results  GLUCOSE (mg/dL)   Date Value   02/04/2025 113 (H)     Glucose (mg/dL)   Date Value   04/10/2024 98   10/09/2023 135 (H)   02/01/2023 107 (H)   04/29/2022 301 (H) "   01/13/2022 185 (H)     POC HEMOGLOBIN A1c (%)   Date Value   10/15/2024 6.8 (A)   04/09/2024 6.6 (A)     Hemoglobin A1C (%)   Date Value   10/09/2023 6.4 (H)   10/06/2021 7.3 (H)     CARBON DIOXIDE (mmol/L)   Date Value   02/04/2025 31     Bicarbonate (mmol/L)   Date Value   04/10/2024 29   10/09/2023 31   02/01/2023 33 (H)   04/29/2022 29   01/13/2022 31     UREA NITROGEN (BUN) (mg/dL)   Date Value   02/04/2025 22     Urea Nitrogen (mg/dL)   Date Value   04/10/2024 25 (H)   10/09/2023 19   02/01/2023 22   04/29/2022 25 (H)   01/13/2022 25 (H)     Creatinine (mg/dL)   Date Value   04/10/2024 0.76   10/09/2023 0.67   02/01/2023 0.72   04/29/2022 0.85   01/13/2022 0.71     CREATININE (mg/dL)   Date Value   02/04/2025 0.70     Lab Results   Component Value Date    CHOL 150 02/04/2025    CHOL 160 04/10/2024    CHOL 154 10/09/2023     Lab Results   Component Value Date    HDL 36 (L) 02/04/2025    HDL 32.2 04/10/2024    HDL 35.3 10/09/2023     Lab Results   Component Value Date    LDLCALC 89 02/04/2025    LDLCALC 85 04/10/2024    LDLCALC 75 (L) 10/09/2023     Lab Results   Component Value Date    TRIG 155 (H) 02/04/2025    TRIG 213 (H) 04/10/2024    TRIG 217 (H) 10/09/2023       IMPRESSION  TYPE 2 DIABETES MELLITUS   LONG TERM CURRENT INSULIN USE  Rapid A1c 6.2%  Excellent glucose control      RECOMMENDATIONS  Continue current program  Follow up 6 months  Labs as ordered by Dr. Diaz  Bring written glucose record (2 weeks' worth) to all appointments.

## 2025-04-14 DIAGNOSIS — E11.9 TYPE 2 DIABETES MELLITUS WITHOUT COMPLICATION, WITH LONG-TERM CURRENT USE OF INSULIN: ICD-10-CM

## 2025-04-14 DIAGNOSIS — Z79.4 TYPE 2 DIABETES MELLITUS WITHOUT COMPLICATION, WITH LONG-TERM CURRENT USE OF INSULIN: ICD-10-CM

## 2025-04-14 RX ORDER — INSULIN DEGLUDEC 200 U/ML
INJECTION, SOLUTION SUBCUTANEOUS
Qty: 18 ML | Refills: 11 | Status: SHIPPED | OUTPATIENT
Start: 2025-04-14

## 2025-04-14 NOTE — TELEPHONE ENCOUNTER
Requested Medication: [facility]     Pharmacy: [facility] in [place]     Last Filled: 4/9/24  Last office visit: 4/9/25  Next follow up: 10/7/25    Leaves for vacation tomorrow.

## 2025-04-25 DIAGNOSIS — I10 ESSENTIAL (PRIMARY) HYPERTENSION: ICD-10-CM

## 2025-04-25 RX ORDER — CARVEDILOL 12.5 MG/1
12.5 TABLET ORAL 2 TIMES DAILY
Qty: 180 TABLET | Refills: 1 | Status: SHIPPED | OUTPATIENT
Start: 2025-04-25

## 2025-05-04 DIAGNOSIS — I10 ESSENTIAL (PRIMARY) HYPERTENSION: ICD-10-CM

## 2025-05-04 DIAGNOSIS — E11.9 TYPE 2 DIABETES MELLITUS WITHOUT COMPLICATIONS: ICD-10-CM

## 2025-05-04 RX ORDER — METFORMIN HYDROCHLORIDE 1000 MG/1
1000 TABLET ORAL
Qty: 180 TABLET | Refills: 3 | Status: SHIPPED | OUTPATIENT
Start: 2025-05-04

## 2025-05-06 RX ORDER — HYDROCHLOROTHIAZIDE 25 MG/1
25 TABLET ORAL DAILY
Qty: 90 TABLET | Refills: 1 | Status: SHIPPED | OUTPATIENT
Start: 2025-05-06

## 2025-05-16 ENCOUNTER — APPOINTMENT (OUTPATIENT)
Dept: PRIMARY CARE | Facility: CLINIC | Age: 70
End: 2025-05-16
Payer: MEDICARE

## 2025-05-16 VITALS
HEART RATE: 76 BPM | SYSTOLIC BLOOD PRESSURE: 140 MMHG | WEIGHT: 272 LBS | DIASTOLIC BLOOD PRESSURE: 70 MMHG | OXYGEN SATURATION: 94 % | BODY MASS INDEX: 38.94 KG/M2 | HEIGHT: 70 IN

## 2025-05-16 DIAGNOSIS — I42.9 CARDIOMYOPATHY, UNSPECIFIED TYPE (MULTI): ICD-10-CM

## 2025-05-16 DIAGNOSIS — E78.01 FAMILIAL HYPERCHOLESTEROLEMIA: ICD-10-CM

## 2025-05-16 DIAGNOSIS — I10 ESSENTIAL (PRIMARY) HYPERTENSION: ICD-10-CM

## 2025-05-16 DIAGNOSIS — Z12.31 ENCOUNTER FOR SCREENING MAMMOGRAM FOR MALIGNANT NEOPLASM OF BREAST: ICD-10-CM

## 2025-05-16 DIAGNOSIS — E66.812 CLASS 2 OBESITY WITH BODY MASS INDEX (BMI) OF 39.0 TO 39.9 IN ADULT, UNSPECIFIED OBESITY TYPE, UNSPECIFIED WHETHER SERIOUS COMORBIDITY PRESENT: ICD-10-CM

## 2025-05-16 DIAGNOSIS — I10 PRIMARY HYPERTENSION: ICD-10-CM

## 2025-05-16 DIAGNOSIS — Z00.00 ROUTINE GENERAL MEDICAL EXAMINATION AT HEALTH CARE FACILITY: Primary | ICD-10-CM

## 2025-05-16 PROCEDURE — 1159F MED LIST DOCD IN RCRD: CPT | Performed by: INTERNAL MEDICINE

## 2025-05-16 PROCEDURE — 3044F HG A1C LEVEL LT 7.0%: CPT | Performed by: INTERNAL MEDICINE

## 2025-05-16 PROCEDURE — 3077F SYST BP >= 140 MM HG: CPT | Performed by: INTERNAL MEDICINE

## 2025-05-16 PROCEDURE — 3078F DIAST BP <80 MM HG: CPT | Performed by: INTERNAL MEDICINE

## 2025-05-16 PROCEDURE — 4010F ACE/ARB THERAPY RXD/TAKEN: CPT | Performed by: INTERNAL MEDICINE

## 2025-05-16 PROCEDURE — 1124F ACP DISCUSS-NO DSCNMKR DOCD: CPT | Performed by: INTERNAL MEDICINE

## 2025-05-16 PROCEDURE — 99214 OFFICE O/P EST MOD 30 MIN: CPT | Performed by: INTERNAL MEDICINE

## 2025-05-16 PROCEDURE — 3008F BODY MASS INDEX DOCD: CPT | Performed by: INTERNAL MEDICINE

## 2025-05-16 PROCEDURE — 1170F FXNL STATUS ASSESSED: CPT | Performed by: INTERNAL MEDICINE

## 2025-05-16 PROCEDURE — 1036F TOBACCO NON-USER: CPT | Performed by: INTERNAL MEDICINE

## 2025-05-16 PROCEDURE — G0439 PPPS, SUBSEQ VISIT: HCPCS | Performed by: INTERNAL MEDICINE

## 2025-05-16 RX ORDER — CARVEDILOL 25 MG/1
25 TABLET ORAL 2 TIMES DAILY
Qty: 180 TABLET | Refills: 1 | Status: SHIPPED | OUTPATIENT
Start: 2025-05-16

## 2025-05-16 ASSESSMENT — ACTIVITIES OF DAILY LIVING (ADL)
DOING_HOUSEWORK: INDEPENDENT
MANAGING_FINANCES: INDEPENDENT
GROCERY_SHOPPING: INDEPENDENT
DRESSING: INDEPENDENT
BATHING: INDEPENDENT
TAKING_MEDICATION: INDEPENDENT

## 2025-05-16 ASSESSMENT — ENCOUNTER SYMPTOMS
LOSS OF SENSATION IN FEET: 0
PALPITATIONS: 0
DEPRESSION: 0
ABDOMINAL PAIN: 0
SHORTNESS OF BREATH: 0
ACTIVITY CHANGE: 0
NAUSEA: 0
FEVER: 0
DIARRHEA: 0
ARTHRALGIAS: 1
OCCASIONAL FEELINGS OF UNSTEADINESS: 1
CONSTIPATION: 0
CHILLS: 0
APPETITE CHANGE: 0
VOMITING: 0
DIFFICULTY URINATING: 0
CHEST TIGHTNESS: 0

## 2025-05-16 ASSESSMENT — PATIENT HEALTH QUESTIONNAIRE - PHQ9
2. FEELING DOWN, DEPRESSED OR HOPELESS: NOT AT ALL
SUM OF ALL RESPONSES TO PHQ9 QUESTIONS 1 AND 2: 0
1. LITTLE INTEREST OR PLEASURE IN DOING THINGS: NOT AT ALL

## 2025-05-16 NOTE — PATIENT INSTRUCTIONS
Recommended vaccines:  -Shingles  -RSV    Can decrease atorvastatin to 40 mg (break in half) if continue to have joint/muscle aches    Increase carvedilol to 25 mg twice daily  -Continue to monitor blood pressure at home, goal blood pressure is less than 130/80    Mammogram ordered - due in August  Please call [phone] to schedule diagnostic imaging (mamm, DEXA, CT scan, etc)      Return in 6 months

## 2025-05-16 NOTE — PROGRESS NOTES
"Subjective   Patient ID: Zonia Schaffer is a 69 y.o. female who presents for Medicare Annual Wellness Visit Subsequent and Follow-up.    HPI   Zonia presents today for 6 month follow up.    Brings up some joint aches, wondering if this could be related to increased atorvastatin. Discussed we can decrease atorvastatin dose as she was very close to LDL goal.    She has had a lot of L sided hip pain, would like a hip replacement but is working on weight loss, inquires about nutritionist to help with weight loss.    Blood pressure slightly elevated in the office today - 140/70, states that how it has run for many years.     Review of Systems   Constitutional:  Negative for activity change, appetite change, chills and fever.   Respiratory:  Negative for chest tightness and shortness of breath.    Cardiovascular:  Negative for chest pain, palpitations and leg swelling.   Gastrointestinal:  Negative for abdominal pain, constipation, diarrhea, nausea and vomiting.   Genitourinary:  Negative for difficulty urinating.   Musculoskeletal:  Positive for arthralgias.       Objective   /70   Pulse 76   Ht 1.778 m (5' 10\")   Wt 123 kg (272 lb)   SpO2 94%   BMI 39.03 kg/m²     Physical Exam  Constitutional:       General: She is not in acute distress.     Appearance: Normal appearance. She is obese. She is not ill-appearing.   HENT:      Head: Normocephalic and atraumatic.   Cardiovascular:      Rate and Rhythm: Normal rate and regular rhythm.   Pulmonary:      Effort: Pulmonary effort is normal. No respiratory distress.      Breath sounds: No stridor. No wheezing, rhonchi or rales.   Musculoskeletal:      Right lower leg: No edema.      Left lower leg: No edema.   Skin:     General: Skin is warm and dry.   Neurological:      General: No focal deficit present.      Mental Status: She is alert and oriented to person, place, and time.   Psychiatric:         Mood and Affect: Mood normal.         Behavior: Behavior normal. "         Assessment/Plan   #HTN  -Blood pressure elevated in the office and at home  -INCREASE carvedilol to 25 mg BID,   -Continue lisinopril 40mg daily, HCTZ 25mg daily  -Encouraged to continue monitoring blood pressure at home, goal <130/80     #Frequent PVCs, HFrEF (EF has since improved to 50-55%)  -S/p ablation  -Following with cardiology  -Cont carvedilol      #HLD  -LDL was not at goal at 89 on 2/2025 - atorva was increased to 80 mg   -Cont atorva 80mg daily - also okay to decrease to 40 mg daily if continues to have joint/muscle aches  -[facility] 10/21 with trivial nonob CAD      #DM2  -Follows with Dr. Juan  -A1c 6.2% 4/2025  -Cont Rybelsus 7 mg daily, Tresiba 94 U daily, metformin 1000mg bid and glipizide 10mg bid      #BL LE venous insufficiency   -Stable     Influenza: UTD 2024  COVID: x 5 (2024 most recent)  TDaP: 3/2023  Prevnar 20: 1/2024  RSV: Recommended  Shingrix: Recommended  Mamm: 8/2024 - repeat ordered  DEXA: 8/2024  Pap: NI  Colorectal ca: 8/2024 - repeat 10 years    RTC 6 months     Patient seen and discussed with Arlene Genao MD  Internal Medicine PGY-1

## 2025-06-03 DIAGNOSIS — E11.9 TYPE 2 DIABETES MELLITUS WITHOUT COMPLICATIONS: ICD-10-CM

## 2025-06-04 RX ORDER — GLIPIZIDE 10 MG/1
10 TABLET ORAL 2 TIMES DAILY
Qty: 180 TABLET | Refills: 3 | Status: SHIPPED | OUTPATIENT
Start: 2025-06-04

## 2025-09-02 DIAGNOSIS — I10 ESSENTIAL (PRIMARY) HYPERTENSION: ICD-10-CM

## 2025-09-03 RX ORDER — LISINOPRIL 40 MG/1
40 TABLET ORAL
Qty: 90 TABLET | Refills: 1 | Status: SHIPPED | OUTPATIENT
Start: 2025-09-03

## 2025-10-07 ENCOUNTER — APPOINTMENT (OUTPATIENT)
Dept: ENDOCRINOLOGY | Facility: CLINIC | Age: 70
End: 2025-10-07
Payer: MEDICARE

## 2025-11-14 ENCOUNTER — APPOINTMENT (OUTPATIENT)
Dept: PRIMARY CARE | Facility: CLINIC | Age: 70
End: 2025-11-14
Payer: MEDICARE